# Patient Record
Sex: MALE | Race: WHITE | NOT HISPANIC OR LATINO | URBAN - METROPOLITAN AREA
[De-identification: names, ages, dates, MRNs, and addresses within clinical notes are randomized per-mention and may not be internally consistent; named-entity substitution may affect disease eponyms.]

---
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
## 2021-08-16 ENCOUNTER — INPATIENT (INPATIENT)
Facility: HOSPITAL | Age: 21
LOS: 9 days | Discharge: HOME | DRG: 885 | End: 2021-08-26
Attending: PSYCHIATRY & NEUROLOGY | Admitting: PSYCHIATRY & NEUROLOGY
Payer: COMMERCIAL

## 2021-08-16 VITALS
OXYGEN SATURATION: 97 % | WEIGHT: 169.98 LBS | HEART RATE: 110 BPM | DIASTOLIC BLOOD PRESSURE: 94 MMHG | RESPIRATION RATE: 18 BRPM | TEMPERATURE: 98 F | HEIGHT: 68 IN | SYSTOLIC BLOOD PRESSURE: 149 MMHG

## 2021-08-16 DIAGNOSIS — F20.9 SCHIZOPHRENIA, UNSPECIFIED: ICD-10-CM

## 2021-08-16 LAB
ANION GAP SERPL CALC-SCNC: 3 MMOL/L — LOW (ref 5–17)
APAP SERPL-MCNC: <2 UG/ML — LOW (ref 10–30)
APPEARANCE UR: CLEAR — SIGNIFICANT CHANGE UP
BASOPHILS # BLD AUTO: 0.05 K/UL — SIGNIFICANT CHANGE UP (ref 0–0.2)
BASOPHILS NFR BLD AUTO: 0.4 % — SIGNIFICANT CHANGE UP (ref 0–2)
BILIRUB UR-MCNC: NEGATIVE — SIGNIFICANT CHANGE UP
BUN SERPL-MCNC: 6 MG/DL — LOW (ref 7–23)
CALCIUM SERPL-MCNC: 9.7 MG/DL — SIGNIFICANT CHANGE UP (ref 8.5–10.1)
CHLORIDE SERPL-SCNC: 107 MMOL/L — SIGNIFICANT CHANGE UP (ref 96–108)
CO2 SERPL-SCNC: 27 MMOL/L — SIGNIFICANT CHANGE UP (ref 22–31)
COLOR SPEC: YELLOW — SIGNIFICANT CHANGE UP
CREAT SERPL-MCNC: 1.07 MG/DL — SIGNIFICANT CHANGE UP (ref 0.5–1.3)
DIFF PNL FLD: NEGATIVE — SIGNIFICANT CHANGE UP
EOSINOPHIL # BLD AUTO: 0.08 K/UL — SIGNIFICANT CHANGE UP (ref 0–0.5)
EOSINOPHIL NFR BLD AUTO: 0.6 % — SIGNIFICANT CHANGE UP (ref 0–6)
ETHANOL SERPL-MCNC: <10 MG/DL — SIGNIFICANT CHANGE UP (ref 0–10)
GLUCOSE SERPL-MCNC: 97 MG/DL — SIGNIFICANT CHANGE UP (ref 70–99)
GLUCOSE UR QL: NEGATIVE MG/DL — SIGNIFICANT CHANGE UP
HCT VFR BLD CALC: 49.9 % — SIGNIFICANT CHANGE UP (ref 39–50)
HGB BLD-MCNC: 17.2 G/DL — HIGH (ref 13–17)
IMM GRANULOCYTES NFR BLD AUTO: 0.4 % — SIGNIFICANT CHANGE UP (ref 0–1.5)
KETONES UR-MCNC: NEGATIVE — SIGNIFICANT CHANGE UP
LEUKOCYTE ESTERASE UR-ACNC: NEGATIVE — SIGNIFICANT CHANGE UP
LYMPHOCYTES # BLD AUTO: 0.92 K/UL — LOW (ref 1–3.3)
LYMPHOCYTES # BLD AUTO: 6.9 % — LOW (ref 13–44)
MCHC RBC-ENTMCNC: 30.6 PG — SIGNIFICANT CHANGE UP (ref 27–34)
MCHC RBC-ENTMCNC: 34.5 GM/DL — SIGNIFICANT CHANGE UP (ref 32–36)
MCV RBC AUTO: 88.6 FL — SIGNIFICANT CHANGE UP (ref 80–100)
MONOCYTES # BLD AUTO: 0.8 K/UL — SIGNIFICANT CHANGE UP (ref 0–0.9)
MONOCYTES NFR BLD AUTO: 6 % — SIGNIFICANT CHANGE UP (ref 2–14)
NEUTROPHILS # BLD AUTO: 11.44 K/UL — HIGH (ref 1.8–7.4)
NEUTROPHILS NFR BLD AUTO: 85.7 % — HIGH (ref 43–77)
NITRITE UR-MCNC: NEGATIVE — SIGNIFICANT CHANGE UP
PCP SPEC-MCNC: SIGNIFICANT CHANGE UP
PH UR: 7 — SIGNIFICANT CHANGE UP (ref 5–8)
PLATELET # BLD AUTO: 240 K/UL — SIGNIFICANT CHANGE UP (ref 150–400)
POTASSIUM SERPL-MCNC: 3.9 MMOL/L — SIGNIFICANT CHANGE UP (ref 3.5–5.3)
POTASSIUM SERPL-SCNC: 3.9 MMOL/L — SIGNIFICANT CHANGE UP (ref 3.5–5.3)
PROT UR-MCNC: NEGATIVE MG/DL — SIGNIFICANT CHANGE UP
RBC # BLD: 5.63 M/UL — SIGNIFICANT CHANGE UP (ref 4.2–5.8)
RBC # FLD: 12.5 % — SIGNIFICANT CHANGE UP (ref 10.3–14.5)
SALICYLATES SERPL-MCNC: 6 MG/DL — SIGNIFICANT CHANGE UP (ref 2.8–20)
SARS-COV-2 RNA SPEC QL NAA+PROBE: SIGNIFICANT CHANGE UP
SODIUM SERPL-SCNC: 137 MMOL/L — SIGNIFICANT CHANGE UP (ref 135–145)
SP GR SPEC: 1 — LOW (ref 1.01–1.02)
UROBILINOGEN FLD QL: NEGATIVE MG/DL — SIGNIFICANT CHANGE UP
WBC # BLD: 13.34 K/UL — HIGH (ref 3.8–10.5)
WBC # FLD AUTO: 13.34 K/UL — HIGH (ref 3.8–10.5)

## 2021-08-16 PROCEDURE — 36415 COLL VENOUS BLD VENIPUNCTURE: CPT

## 2021-08-16 PROCEDURE — 82150 ASSAY OF AMYLASE: CPT

## 2021-08-16 PROCEDURE — 85025 COMPLETE CBC W/AUTO DIFF WBC: CPT

## 2021-08-16 PROCEDURE — 71045 X-RAY EXAM CHEST 1 VIEW: CPT

## 2021-08-16 PROCEDURE — 80053 COMPREHEN METABOLIC PANEL: CPT

## 2021-08-16 PROCEDURE — 93005 ELECTROCARDIOGRAM TRACING: CPT

## 2021-08-16 PROCEDURE — 81003 URINALYSIS AUTO W/O SCOPE: CPT

## 2021-08-16 PROCEDURE — 76700 US EXAM ABDOM COMPLETE: CPT

## 2021-08-16 PROCEDURE — 99285 EMERGENCY DEPT VISIT HI MDM: CPT

## 2021-08-16 PROCEDURE — 86769 SARS-COV-2 COVID-19 ANTIBODY: CPT

## 2021-08-16 PROCEDURE — 93010 ELECTROCARDIOGRAM REPORT: CPT

## 2021-08-16 PROCEDURE — 80061 LIPID PANEL: CPT

## 2021-08-16 PROCEDURE — 83690 ASSAY OF LIPASE: CPT

## 2021-08-16 PROCEDURE — 90792 PSYCH DIAG EVAL W/MED SRVCS: CPT

## 2021-08-16 RX ORDER — DIPHENHYDRAMINE HCL 50 MG
50 CAPSULE ORAL EVERY 6 HOURS
Refills: 0 | Status: DISCONTINUED | OUTPATIENT
Start: 2021-08-16 | End: 2021-08-26

## 2021-08-16 RX ORDER — HALOPERIDOL DECANOATE 100 MG/ML
5 INJECTION INTRAMUSCULAR EVERY 6 HOURS
Refills: 0 | Status: DISCONTINUED | OUTPATIENT
Start: 2021-08-16 | End: 2021-08-26

## 2021-08-16 RX ORDER — SERTRALINE 25 MG/1
200 TABLET, FILM COATED ORAL DAILY
Refills: 0 | Status: DISCONTINUED | OUTPATIENT
Start: 2021-08-16 | End: 2021-08-17

## 2021-08-16 RX ORDER — RISPERIDONE 4 MG/1
3 TABLET ORAL
Refills: 0 | Status: DISCONTINUED | OUTPATIENT
Start: 2021-08-16 | End: 2021-08-23

## 2021-08-16 NOTE — ED ADULT NURSE NOTE - NSIMPLEMENTINTERV_GEN_ALL_ED
Implemented All Universal Safety Interventions:  [facility] to call system. Call bell, personal items and telephone within reach. Instruct patient to call for assistance. Room bathroom lighting operational. Non-slip footwear when patient is off stretcher. Physically safe environment: no spills, clutter or unnecessary equipment. Stretcher in lowest position, wheels locked, appropriate side rails in place.
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
## 2021-08-16 NOTE — ED BEHAVIORAL HEALTH ASSESSMENT NOTE - VIOLENCE RISK FACTORS:
Violent ideation/threat/speech/Affective dysregulation/Impulsivity/Lack of insight into violence risk/need for treatment
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
## 2021-08-16 NOTE — ED ADULT TRIAGE NOTE - BMI (KG/M2)
The patient will be seen in the office between 3 weeks for wound check. S Patient may shower after post-op day #5 (8/3/20). The dressing is to be removed on postop day #7 (8/5/20). IF THE DRESSING BECOMES SOILED BEFORE THE REMOVAL DATE, CHANGE WITH A SIMILAR DRESSING. IF THE DRESSING BECOMES STAINED WITH DISCHARGE, CONTACT THE OFFICE FOR FURTHER DIRECTIONS.  The patient will contact the office if the wound becomes red, has increasing pain, develops bleeding or discharge, an injury occurs, or has other concerns. The patient will continue PT for gait training. The patient will continue ASPIRIN for blood clot prevention. The patient will take OXYCODONE AND TYLENOL for pain control and titrate according to prescription and patient needs. The patient will take Colace while taking oxycodone to prevent narcotic associated constipation.  Additionally, increase water intake (drink at least 8 glasses of water daily) and try adding fiber to the diet by eating fruits, vegetables and foods that are rich in grains. If constipation is experienced, contact the medical/primary care provider to discuss further treatment options. The patient is FULL weight bearing with knee immobilizer on at all times locked in extension. 25.8
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
## 2021-08-16 NOTE — ED PROVIDER NOTE - SKIN, MLM
Skin normal color for race, warm, dry and intact. No evidence of rash. A few abrasions on knees and hands.

## 2021-08-16 NOTE — ED ADULT NURSE REASSESSMENT NOTE - NS ED NURSE REASSESS COMMENT FT1
handoff report taken from day JELLY Burns. Pt resting comfortably in bed with no acute distress noted. Pt on constant observation, updated on their status, the current plan of care, and available results no needs or requests at this time.  Will continue to monitor/reassess.

## 2021-08-16 NOTE — ED BEHAVIORAL HEALTH ASSESSMENT NOTE - DESCRIPTION
NO unemployed, playing video game all day, resides with mother and siblings in grandmother[facility] house cooperative,   poor eye contact, easily irritable

## 2021-08-17 DIAGNOSIS — F20.9 SCHIZOPHRENIA, UNSPECIFIED: ICD-10-CM

## 2021-08-17 DIAGNOSIS — F12.10 CANNABIS ABUSE, UNCOMPLICATED: ICD-10-CM

## 2021-08-17 LAB
A1C WITH ESTIMATED AVERAGE GLUCOSE RESULT: 4.9 % — SIGNIFICANT CHANGE UP (ref 4–5.6)
CHOLEST SERPL-MCNC: 208 MG/DL — HIGH
COVID-19 SPIKE DOMAIN AB INTERP: POSITIVE
COVID-19 SPIKE DOMAIN AB INTERP: POSITIVE
COVID-19 SPIKE DOMAIN ANTIBODY RESULT: >250 U/ML — HIGH
COVID-19 SPIKE DOMAIN ANTIBODY RESULT: >250 U/ML — HIGH
ESTIMATED AVERAGE GLUCOSE: 94 MG/DL — SIGNIFICANT CHANGE UP (ref 68–114)
HDLC SERPL-MCNC: 45 MG/DL — SIGNIFICANT CHANGE UP
LIPID PNL WITH DIRECT LDL SERPL: 142 MG/DL — HIGH
NON HDL CHOLESTEROL: 163 MG/DL — HIGH
SARS-COV-2 IGG+IGM SERPL QL IA: >250 U/ML — HIGH
SARS-COV-2 IGG+IGM SERPL QL IA: >250 U/ML — HIGH
SARS-COV-2 IGG+IGM SERPL QL IA: POSITIVE
SARS-COV-2 IGG+IGM SERPL QL IA: POSITIVE
TRIGL SERPL-MCNC: 106 MG/DL — SIGNIFICANT CHANGE UP

## 2021-08-17 PROCEDURE — 99223 1ST HOSP IP/OBS HIGH 75: CPT

## 2021-08-17 PROCEDURE — 93010 ELECTROCARDIOGRAM REPORT: CPT

## 2021-08-17 RX ORDER — SERTRALINE 25 MG/1
150 TABLET, FILM COATED ORAL DAILY
Refills: 0 | Status: DISCONTINUED | OUTPATIENT
Start: 2021-08-17 | End: 2021-08-18

## 2021-08-17 RX ADMIN — RISPERIDONE 3 MILLIGRAM(S): 4 TABLET ORAL at 09:40

## 2021-08-17 RX ADMIN — RISPERIDONE 3 MILLIGRAM(S): 4 TABLET ORAL at 21:36

## 2021-08-17 RX ADMIN — SERTRALINE 200 MILLIGRAM(S): 25 TABLET, FILM COATED ORAL at 09:39

## 2021-08-17 NOTE — PROGRESS NOTE BEHAVIORAL HEALTH - NSBHADDHXPSYCHFT_PSY_A_CORE
multiple hospitalizations,  mostly in [facility], no hx of SA or SI, hears voices as his baseline,  psychiatrist Dr Sree Louie of [facility]    kailash Louie of [facility]

## 2021-08-17 NOTE — PROGRESS NOTE BEHAVIORAL HEALTH - RISK ASSESSMENT
moderate risk  acute : pt denies any hallucination but is guarded, suspicious, appearing preoccupied , irritable , low frustration tolerance. Use of weapon   mitigating factors: pt denies any suicidal ideation intent or plan   protective: family support  chronic: prior psych hospitalization

## 2021-08-18 PROCEDURE — 99231 SBSQ HOSP IP/OBS SF/LOW 25: CPT

## 2021-08-18 RX ORDER — SERTRALINE 25 MG/1
200 TABLET, FILM COATED ORAL DAILY
Refills: 0 | Status: DISCONTINUED | OUTPATIENT
Start: 2021-08-19 | End: 2021-08-23

## 2021-08-18 RX ADMIN — RISPERIDONE 3 MILLIGRAM(S): 4 TABLET ORAL at 09:24

## 2021-08-18 RX ADMIN — SERTRALINE 150 MILLIGRAM(S): 25 TABLET, FILM COATED ORAL at 09:24

## 2021-08-18 RX ADMIN — RISPERIDONE 3 MILLIGRAM(S): 4 TABLET ORAL at 20:57
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
## 2021-08-18 NOTE — DIETITIAN INITIAL EVALUATION ADULT. - ORAL INTAKE PTA/DIET HISTORY
Pt reports that he was not eating at home, only drinking  Pt not eating at HH, and says he will not eat.  Pt expecting to be discharged tomorrow, as that will be 72 hours since his admit.

## 2021-08-18 NOTE — DIETITIAN INITIAL EVALUATION ADULT. - NAME AND PHONE
Yazmin Davis RDN, CDN, [facility]      [phone]   [email or web address]

## 2021-08-18 NOTE — PROGRESS NOTE BEHAVIORAL HEALTH - RISK ASSESSMENT
moderate risk  acute : pt denies any hallucination but is guarded, suspicious, appearing preoccupied , irritable , low frustration tolerance. Use of weapon , auditory hallucination ,   mitigating factors: pt denies any suicidal ideation intent or plan   protective: family support  chronic: prior psych hospitalization, hx of more self directed injury

## 2021-08-18 NOTE — DIETITIAN INITIAL EVALUATION ADULT. - PERTINENT MEDS FT
MEDICATIONS  (STANDING):  risperiDONE   Tablet 3 milliGRAM(s) Oral two times a day  sertraline 150 milliGRAM(s) Oral daily    MEDICATIONS  (PRN):  diphenhydrAMINE   Injectable 50 milliGRAM(s) IntraMuscular every 6 hours PRN Agitation  haloperidol    Injectable 5 milliGRAM(s) IntraMuscular every 6 hours PRN Agitation  LORazepam   Injectable 2 milliGRAM(s) IntraMuscular every 4 hours PRN Agitation

## 2021-08-19 PROCEDURE — 99253 IP/OBS CNSLTJ NEW/EST LOW 45: CPT

## 2021-08-19 PROCEDURE — 99232 SBSQ HOSP IP/OBS MODERATE 35: CPT

## 2021-08-19 RX ORDER — MULTIVIT-MIN/FERROUS GLUCONATE 9 MG/15 ML
1 LIQUID (ML) ORAL DAILY
Refills: 0 | Status: DISCONTINUED | OUTPATIENT
Start: 2021-08-19 | End: 2021-08-26

## 2021-08-19 RX ADMIN — RISPERIDONE 3 MILLIGRAM(S): 4 TABLET ORAL at 21:10

## 2021-08-19 RX ADMIN — RISPERIDONE 3 MILLIGRAM(S): 4 TABLET ORAL at 09:25

## 2021-08-19 RX ADMIN — SERTRALINE 200 MILLIGRAM(S): 25 TABLET, FILM COATED ORAL at 09:25
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
## 2021-08-19 NOTE — CONSULT NOTE ADULT - ATTENDING COMMENTS
Patient is seen and examined with the PA. Medicine consult was requested for patient's diarrhea and loss of appetite. Will follow stool work up and imaging studies.

## 2021-08-19 NOTE — PROGRESS NOTE BEHAVIORAL HEALTH - RISK ASSESSMENT
moderate risk  acute : pt denies any hallucination but is guarded, suspicious, appearing preoccupied , irritable , low frustration tolerance. Use of weapon , auditory hallucination , decreased appetite  mitigating factors: pt denies any suicidal ideation intent or plan   protective: family support  chronic: prior psych hospitalization, hx of more self directed injury
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
## 2021-08-20 LAB
ALBUMIN SERPL ELPH-MCNC: 4.7 G/DL — SIGNIFICANT CHANGE UP (ref 3.3–5)
ALP SERPL-CCNC: 97 U/L — SIGNIFICANT CHANGE UP (ref 40–120)
ALT FLD-CCNC: 23 U/L — SIGNIFICANT CHANGE UP (ref 12–78)
AMYLASE P1 CFR SERPL: 41 U/L — SIGNIFICANT CHANGE UP (ref 25–115)
ANION GAP SERPL CALC-SCNC: 7 MMOL/L — SIGNIFICANT CHANGE UP (ref 5–17)
AST SERPL-CCNC: 17 U/L — SIGNIFICANT CHANGE UP (ref 15–37)
BASOPHILS # BLD AUTO: 0.06 K/UL — SIGNIFICANT CHANGE UP (ref 0–0.2)
BASOPHILS NFR BLD AUTO: 0.6 % — SIGNIFICANT CHANGE UP (ref 0–2)
BILIRUB SERPL-MCNC: 1.4 MG/DL — HIGH (ref 0.2–1.2)
BUN SERPL-MCNC: 19 MG/DL — SIGNIFICANT CHANGE UP (ref 7–23)
CALCIUM SERPL-MCNC: 9.6 MG/DL — SIGNIFICANT CHANGE UP (ref 8.5–10.1)
CHLORIDE SERPL-SCNC: 103 MMOL/L — SIGNIFICANT CHANGE UP (ref 96–108)
CO2 SERPL-SCNC: 25 MMOL/L — SIGNIFICANT CHANGE UP (ref 22–31)
CREAT SERPL-MCNC: 1.07 MG/DL — SIGNIFICANT CHANGE UP (ref 0.5–1.3)
EOSINOPHIL # BLD AUTO: 0.05 K/UL — SIGNIFICANT CHANGE UP (ref 0–0.5)
EOSINOPHIL NFR BLD AUTO: 0.5 % — SIGNIFICANT CHANGE UP (ref 0–6)
GLUCOSE SERPL-MCNC: 108 MG/DL — HIGH (ref 70–99)
HCT VFR BLD CALC: 52.4 % — HIGH (ref 39–50)
HGB BLD-MCNC: 18 G/DL — HIGH (ref 13–17)
IMM GRANULOCYTES NFR BLD AUTO: 0.6 % — SIGNIFICANT CHANGE UP (ref 0–1.5)
LIDOCAIN IGE QN: 19 U/L — LOW (ref 73–393)
LYMPHOCYTES # BLD AUTO: 1.33 K/UL — SIGNIFICANT CHANGE UP (ref 1–3.3)
LYMPHOCYTES # BLD AUTO: 14.3 % — SIGNIFICANT CHANGE UP (ref 13–44)
MCHC RBC-ENTMCNC: 30 PG — SIGNIFICANT CHANGE UP (ref 27–34)
MCHC RBC-ENTMCNC: 34.4 GM/DL — SIGNIFICANT CHANGE UP (ref 32–36)
MCV RBC AUTO: 87.3 FL — SIGNIFICANT CHANGE UP (ref 80–100)
MONOCYTES # BLD AUTO: 0.66 K/UL — SIGNIFICANT CHANGE UP (ref 0–0.9)
MONOCYTES NFR BLD AUTO: 7.1 % — SIGNIFICANT CHANGE UP (ref 2–14)
NEUTROPHILS # BLD AUTO: 7.15 K/UL — SIGNIFICANT CHANGE UP (ref 1.8–7.4)
NEUTROPHILS NFR BLD AUTO: 76.9 % — SIGNIFICANT CHANGE UP (ref 43–77)
PLATELET # BLD AUTO: 251 K/UL — SIGNIFICANT CHANGE UP (ref 150–400)
POTASSIUM SERPL-MCNC: 4.1 MMOL/L — SIGNIFICANT CHANGE UP (ref 3.5–5.3)
POTASSIUM SERPL-SCNC: 4.1 MMOL/L — SIGNIFICANT CHANGE UP (ref 3.5–5.3)
PROT SERPL-MCNC: 7.5 GM/DL — SIGNIFICANT CHANGE UP (ref 6–8.3)
RBC # BLD: 6 M/UL — HIGH (ref 4.2–5.8)
RBC # FLD: 12 % — SIGNIFICANT CHANGE UP (ref 10.3–14.5)
SODIUM SERPL-SCNC: 135 MMOL/L — SIGNIFICANT CHANGE UP (ref 135–145)
WBC # BLD: 9.31 K/UL — SIGNIFICANT CHANGE UP (ref 3.8–10.5)
WBC # FLD AUTO: 9.31 K/UL — SIGNIFICANT CHANGE UP (ref 3.8–10.5)

## 2021-08-20 PROCEDURE — 99231 SBSQ HOSP IP/OBS SF/LOW 25: CPT

## 2021-08-20 PROCEDURE — 71045 X-RAY EXAM CHEST 1 VIEW: CPT | Mod: 26

## 2021-08-20 PROCEDURE — 76700 US EXAM ABDOM COMPLETE: CPT | Mod: 26

## 2021-08-20 PROCEDURE — 99232 SBSQ HOSP IP/OBS MODERATE 35: CPT

## 2021-08-20 RX ADMIN — SERTRALINE 200 MILLIGRAM(S): 25 TABLET, FILM COATED ORAL at 09:41

## 2021-08-20 RX ADMIN — RISPERIDONE 3 MILLIGRAM(S): 4 TABLET ORAL at 09:41

## 2021-08-20 RX ADMIN — Medication 1 TABLET(S): at 09:41

## 2021-08-20 RX ADMIN — RISPERIDONE 3 MILLIGRAM(S): 4 TABLET ORAL at 20:45

## 2021-08-20 NOTE — PROGRESS NOTE BEHAVIORAL HEALTH - RISK ASSESSMENT
moderate risk  acute : pt denies any hallucination but is guarded, suspicious, appearing preoccupied , less irritable , low frustration tolerance. Use of weapon , auditory hallucination , decreased appetite  mitigating factors: pt denies any suicidal ideation intent or plan   protective: family support  chronic: prior psych hospitalization, hx of more self directed injury
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
## 2021-08-21 PROCEDURE — 99231 SBSQ HOSP IP/OBS SF/LOW 25: CPT

## 2021-08-21 RX ADMIN — RISPERIDONE 3 MILLIGRAM(S): 4 TABLET ORAL at 21:18

## 2021-08-21 RX ADMIN — SERTRALINE 200 MILLIGRAM(S): 25 TABLET, FILM COATED ORAL at 09:39

## 2021-08-21 RX ADMIN — RISPERIDONE 3 MILLIGRAM(S): 4 TABLET ORAL at 09:39

## 2021-08-21 RX ADMIN — Medication 1 TABLET(S): at 09:39

## 2021-08-21 NOTE — PROGRESS NOTE BEHAVIORAL HEALTH - RISK ASSESSMENT
moderate risk of suicide  and /or aggression in the context of ongoing severe paranoid psychosis  acute : pt denies any hallucination but is guarded, suspicious, appearing preoccupied , less irritable , low frustration tolerance. Use of weapon , auditory hallucination , decreased appetite  mitigating factors: pt denies any suicidal ideation intent or plan   protective: family support  chronic: prior psych hospitalization, hx of more self directed injury

## 2021-08-21 NOTE — PROGRESS NOTE BEHAVIORAL HEALTH - OTHER
paranoid, persecutory, somatic delusional thinking, impoverished pt will not / cannot elaborate further overall paucity of speech " Fine." with little animation

## 2021-08-22 LAB
APPEARANCE UR: CLEAR — SIGNIFICANT CHANGE UP
BILIRUB UR-MCNC: NEGATIVE — SIGNIFICANT CHANGE UP
COLOR SPEC: YELLOW — SIGNIFICANT CHANGE UP
DIFF PNL FLD: NEGATIVE — SIGNIFICANT CHANGE UP
GLUCOSE UR QL: NEGATIVE MG/DL — SIGNIFICANT CHANGE UP
KETONES UR-MCNC: NEGATIVE — SIGNIFICANT CHANGE UP
LEUKOCYTE ESTERASE UR-ACNC: NEGATIVE — SIGNIFICANT CHANGE UP
NITRITE UR-MCNC: NEGATIVE — SIGNIFICANT CHANGE UP
PH UR: 7 — SIGNIFICANT CHANGE UP (ref 5–8)
PROT UR-MCNC: NEGATIVE MG/DL — SIGNIFICANT CHANGE UP
SP GR SPEC: 1 — LOW (ref 1.01–1.02)
UROBILINOGEN FLD QL: NEGATIVE MG/DL — SIGNIFICANT CHANGE UP

## 2021-08-22 PROCEDURE — 99231 SBSQ HOSP IP/OBS SF/LOW 25: CPT

## 2021-08-22 RX ADMIN — RISPERIDONE 3 MILLIGRAM(S): 4 TABLET ORAL at 20:29

## 2021-08-22 RX ADMIN — Medication 1 TABLET(S): at 09:11

## 2021-08-22 RX ADMIN — RISPERIDONE 3 MILLIGRAM(S): 4 TABLET ORAL at 09:11

## 2021-08-22 RX ADMIN — SERTRALINE 200 MILLIGRAM(S): 25 TABLET, FILM COATED ORAL at 09:11

## 2021-08-22 NOTE — PROGRESS NOTE BEHAVIORAL HEALTH - OTHER
robotic, superficially cooperative but not accurately forthcoming steady  but wary eye contact " Fine." pt will not / cannot elaborate further withdrawn, reclusive with little animation overall paucity of speech paranoid, persecutory, somatic delusional thinking, impoverished

## 2021-08-23 PROCEDURE — 99232 SBSQ HOSP IP/OBS MODERATE 35: CPT

## 2021-08-23 RX ORDER — HALOPERIDOL DECANOATE 100 MG/ML
5 INJECTION INTRAMUSCULAR
Refills: 0 | Status: DISCONTINUED | OUTPATIENT
Start: 2021-08-23 | End: 2021-08-26

## 2021-08-23 RX ORDER — SERTRALINE 25 MG/1
100 TABLET, FILM COATED ORAL DAILY
Refills: 0 | Status: DISCONTINUED | OUTPATIENT
Start: 2021-08-24 | End: 2021-08-26

## 2021-08-23 RX ADMIN — HALOPERIDOL DECANOATE 5 MILLIGRAM(S): 100 INJECTION INTRAMUSCULAR at 20:35

## 2021-08-23 RX ADMIN — SERTRALINE 200 MILLIGRAM(S): 25 TABLET, FILM COATED ORAL at 10:05

## 2021-08-23 RX ADMIN — RISPERIDONE 3 MILLIGRAM(S): 4 TABLET ORAL at 10:05

## 2021-08-23 RX ADMIN — Medication 1 TABLET(S): at 10:05

## 2021-08-23 NOTE — PROGRESS NOTE BEHAVIORAL HEALTH - OTHER
" Fine." pt will not / cannot elaborate further paranoid, persecutory, somatic delusional thinking, impoverished robotic, superficially cooperative but not accurately forthcoming withdrawn, reclusive overall paucity of speech steady  but wary eye contact with little animation

## 2021-08-24 PROCEDURE — 99232 SBSQ HOSP IP/OBS MODERATE 35: CPT

## 2021-08-24 RX ADMIN — HALOPERIDOL DECANOATE 5 MILLIGRAM(S): 100 INJECTION INTRAMUSCULAR at 21:08

## 2021-08-24 RX ADMIN — Medication 1 TABLET(S): at 09:49

## 2021-08-24 RX ADMIN — SERTRALINE 100 MILLIGRAM(S): 25 TABLET, FILM COATED ORAL at 09:49

## 2021-08-24 RX ADMIN — HALOPERIDOL DECANOATE 5 MILLIGRAM(S): 100 INJECTION INTRAMUSCULAR at 09:49

## 2021-08-24 NOTE — PROGRESS NOTE BEHAVIORAL HEALTH - RISK ASSESSMENT
lowrisk of suicide  still with some preoccupation with paranoid psychosis  acute : pt denies any hallucination but is guarded, suspicious, appearing preoccupied , less irritable , low frustration tolerance. , auditory hallucination , decreased appetite  mitigating factors: pt denies any suicidal ideation intent or plan   protective: family support  chronic: prior psych hospitalization, hx of more self directed injury low risk of suicide  still with some preoccupation with paranoid psychosis but pt is more interactive  acute : pt denies any hallucination but is guarded, suspicious, appearing preoccupied , less irritable , low frustration tolerance. , auditory hallucination , decreased appetite  mitigating factors: pt denies any suicidal ideation intent or plan   protective: family support  chronic: prior psych hospitalization, hx of more self directed injury

## 2021-08-24 NOTE — PROGRESS NOTE BEHAVIORAL HEALTH - OTHER
steady  but wary eye contact robotic, superficially cooperative but not accurately forthcoming " Fine." pt will not / cannot elaborate further withdrawn, reclusive with little animation paranoid, persecutory, somatic delusional thinking, impoverished

## 2021-08-25 PROCEDURE — 99232 SBSQ HOSP IP/OBS MODERATE 35: CPT

## 2021-08-25 RX ADMIN — HALOPERIDOL DECANOATE 5 MILLIGRAM(S): 100 INJECTION INTRAMUSCULAR at 21:03

## 2021-08-25 RX ADMIN — Medication 1 TABLET(S): at 09:09

## 2021-08-25 RX ADMIN — SERTRALINE 100 MILLIGRAM(S): 25 TABLET, FILM COATED ORAL at 09:09

## 2021-08-25 RX ADMIN — HALOPERIDOL DECANOATE 5 MILLIGRAM(S): 100 INJECTION INTRAMUSCULAR at 09:09

## 2021-08-25 NOTE — PROGRESS NOTE BEHAVIORAL HEALTH - GAIT / STATION
Normal gait / station
Normal gait / station
Other
Other
Normal gait / station
Normal gait / station
Other
Other
Normal gait / station

## 2021-08-25 NOTE — PROGRESS NOTE BEHAVIORAL HEALTH - NSBHPTASSESSDT_PSY_A_CORE
17-Aug-2021 14:06
25-Aug-2021 11:47
21-Aug-2021
18-Aug-2021 10:14
23-Aug-2021 18:58
19-Aug-2021 15:24
20-Aug-2021 17:27
22-Aug-2021
24-Aug-2021 09:46

## 2021-08-25 NOTE — PROGRESS NOTE BEHAVIORAL HEALTH - NSBHADMITIPOBSFT_PSY_A_CORE
pt denies any suicidal ideation intent or plan

## 2021-08-25 NOTE — PROGRESS NOTE BEHAVIORAL HEALTH - NSBHFUPINTERVALCCFT_PSY_A_CORE
"I'm leaving today ?"
"I would like to go home.
'When do I leave "
" Do you know when I will be discharged?"
"ask my mother "
"what do I have to do to go home?"
"when do I go home.

## 2021-08-25 NOTE — PROGRESS NOTE BEHAVIORAL HEALTH - ORIENTATION OTHER
pt with ongoing focus on DC home
pt with ongoing focus on DC home
will not answer questions
will not answer questions
pt with ongoing focus on DC home
will not answer questions
pt with ongoing focus on DC home
pt with ongoing focus on DC home
will not answer questions

## 2021-08-25 NOTE — PROGRESS NOTE BEHAVIORAL HEALTH - BEHAVIOR
Uncooperative/Other
Uncooperative/Other
Uncooperative
Cooperative
Uncooperative/Other

## 2021-08-25 NOTE — PROGRESS NOTE BEHAVIORAL HEALTH - ESTIMATED DISCHARGE DATE
27-Aug-2021
31-Aug-2021
27-Aug-2021
31-Aug-2021
27-Aug-2021

## 2021-08-25 NOTE — PROGRESS NOTE BEHAVIORAL HEALTH - SECONDARY DX1
Cannabis abuse

## 2021-08-25 NOTE — PROGRESS NOTE BEHAVIORAL HEALTH - PERCEPTIONS
Auditory hallucinations/Other

## 2021-08-25 NOTE — PROGRESS NOTE BEHAVIORAL HEALTH - NSBHFUPTYPE_PSY_A_CORE
Inpatient
Inpatient-On Service Note
Inpatient

## 2021-08-25 NOTE — PROGRESS NOTE BEHAVIORAL HEALTH - NSBHCHARTREVIEWVS_PSY_A_CORE FT
Vital Signs Last 24 Hrs  T(C): 37.2 (25 Aug 2021 07:54), Max: 37.2 (25 Aug 2021 07:54)  T(F): 99 (25 Aug 2021 07:54), Max: 99 (25 Aug 2021 07:54)  HR: --  BP: --  BP(mean): --  RR: 12 (25 Aug 2021 07:54) (12 - 12)  SpO2: 99% (25 Aug 2021 07:54) (99% - 99%)
Vital Signs Last 24 Hrs  T(C): 36.8 (18 Aug 2021 07:18), Max: 36.8 (18 Aug 2021 07:18)  T(F): 98.3 (18 Aug 2021 07:18), Max: 98.3 (18 Aug 2021 07:18)  HR: --  BP: --  BP(mean): --  RR: 12 (18 Aug 2021 07:18) (12 - 12)  SpO2: 100% (18 Aug 2021 07:18) (100% - 100%)
Vital Signs Last 24 Hrs  T(C): 36.8 (24 Aug 2021 07:35), Max: 36.8 (24 Aug 2021 07:35)  T(F): 98.2 (24 Aug 2021 07:35), Max: 98.2 (24 Aug 2021 07:35)  HR: --  BP: --  BP(mean): --  RR: 14 (24 Aug 2021 07:35) (14 - 14)  SpO2: 100% (24 Aug 2021 07:35) (100% - 100%)
Vital Signs Last 24 Hrs  T(C): 36.8 (21 Aug 2021 08:18), Max: 36.8 (21 Aug 2021 08:18)  T(F): 98.2 (21 Aug 2021 08:18), Max: 98.2 (21 Aug 2021 08:18)  HR: --  BP: --  BP(mean): --  RR: 18 (21 Aug 2021 08:18) (18 - 18)  SpO2: 100% (21 Aug 2021 08:18) (100% - 100%)
Vital Signs Last 24 Hrs  T(C): 36.8 (18 Aug 2021 07:18), Max: 36.8 (18 Aug 2021 07:18)  T(F): 98.3 (18 Aug 2021 07:18), Max: 98.3 (18 Aug 2021 07:18)  HR: --  BP: --  BP(mean): --  RR: 12 (18 Aug 2021 07:18) (12 - 12)  SpO2: 100% (18 Aug 2021 07:18) (100% - 100%)
Vital Signs Last 24 Hrs  T(C): 36.7 (20 Aug 2021 07:34), Max: 36.8 (19 Aug 2021 19:56)  T(F): 98.1 (20 Aug 2021 07:34), Max: 98.2 (19 Aug 2021 19:56)  HR: 91 (19 Aug 2021 19:56) (91 - 91)  BP: 122/77 (19 Aug 2021 19:56) (122/77 - 122/77)  BP(mean): --  RR: 14 (20 Aug 2021 07:34) (14 - 18)  SpO2: 100% (20 Aug 2021 07:34) (96% - 100%)
Vital Signs Last 24 Hrs  T(C): 36.8 (22 Aug 2021 08:24), Max: 36.8 (22 Aug 2021 08:24)  T(F): 98.3 (22 Aug 2021 08:24), Max: 98.3 (22 Aug 2021 08:24)  HR: --  BP: --  BP(mean): --  RR: 16 (22 Aug 2021 08:24) (16 - 16)  SpO2: 98% (22 Aug 2021 08:24) (98% - 98%)
Vital Signs Last 24 Hrs  T(C): 36.8 (22 Aug 2021 08:24), Max: 36.8 (22 Aug 2021 08:24)  T(F): 98.3 (22 Aug 2021 08:24), Max: 98.3 (22 Aug 2021 08:24)  HR: --  BP: --  BP(mean): --  RR: 16 (22 Aug 2021 08:24) (16 - 16)  SpO2: 98% (22 Aug 2021 08:24) (98% - 98%)
Vital Signs Last 24 Hrs  T(C): 37.2 (17 Aug 2021 08:41), Max: 37.2 (17 Aug 2021 08:41)  T(F): 99 (17 Aug 2021 08:41), Max: 99 (17 Aug 2021 08:41)  HR: 92 (16 Aug 2021 19:58) (92 - 92)  BP: 109/62 (16 Aug 2021 19:58) (109/62 - 109/62)  BP(mean): --  RR: 18 (17 Aug 2021 08:41) (18 - 18)  SpO2: 100% (17 Aug 2021 08:41) (97% - 100%)

## 2021-08-25 NOTE — PROGRESS NOTE BEHAVIORAL HEALTH - OTHER
pt will not / cannot elaborate further,  chronic paranoid, persecutory, somatic delusional thinking, impoverished concrete " Fine."

## 2021-08-25 NOTE — PROGRESS NOTE BEHAVIORAL HEALTH - AFFECT CONGRUENCE
Not congruent

## 2021-08-25 NOTE — PROGRESS NOTE BEHAVIORAL HEALTH - PROBLEM SELECTOR PLAN 1
MEDICATIONS  (STANDING):  d/c risperiDONE   Tablet 3 milliGRAM(s) Oral two times a day  decreased sertraline 100 milliGRAM(s) Oral daily  pt to attend groups for insight and coping skills.  encourage attenance with groups.  haldol 5mg po bid
MEDICATIONS  (STANDING):  risperiDONE   Tablet 3 milliGRAM(s) Oral two times a day  sertraline 200 milliGRAM(s) Oral daily  pt to attend groups for insight and coping skills.
MEDICATIONS  (STANDING):  d/c risperiDONE   Tablet 3 milliGRAM(s) Oral two times a day  decreased sertraline 100 milliGRAM(s) Oral daily  pt to attend groups for insight and coping skills.  encourage attenance with groups.  haldol 5mg po bid
MEDICATIONS  (STANDING):  risperiDONE   Tablet 3 milliGRAM(s) Oral two times a day  sertraline 200 milliGRAM(s) Oral daily  pt to attend groups for insight and coping skills.
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
## 2021-08-25 NOTE — PROGRESS NOTE BEHAVIORAL HEALTH - PROBLEM SELECTOR PLAN 2
pt encouraged to attend groups for relapse prevention plan
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
## 2021-08-25 NOTE — PROGRESS NOTE BEHAVIORAL HEALTH - RISK ASSESSMENT
low risk of suicide  still with some preoccupation with paranoid psychosis but pt is more interactive  acute : pt denies any hallucination but is less suspicious, less preoccupied , less irritable , low frustration tolerance. , auditory hallucination , decreased appetite  mitigating factors: pt denies any suicidal ideation intent or plan   protective: family support  chronic: prior psych hospitalization, hx of more self directed injury

## 2021-08-25 NOTE — PROGRESS NOTE BEHAVIORAL HEALTH - NSBHCHARTREVIEWINVESTIGATE_PSY_A_CORE FT
Ventricular Rate 89 BPM    Atrial Rate 89 BPM    P-R Interval 188 ms    QRS Duration 88 ms    Q-T Interval 372 ms    QTC Calculation(Bazett) 452 ms    P Axis 79 degrees    R Axis 93 degrees    T Axis 47 degrees    Diagnosis Line Normal sinus rhythmwith sinus arrhythmia  Rightward axis  Borderline ECG  No previous ECGs available  Confirmed by JENNA ESCOBAR MD ([id number]) on 8/16/2021 7:37:19 PM
Ventricular Rate 89 BPM    Atrial Rate 89 BPM    P-R Interval 188 ms    QRS Duration 88 ms    Q-T Interval 372 ms    QTC Calculation(Bazett) 452 ms    P Axis 79 degrees    R Axis 93 degrees    T Axis 47 degrees    Diagnosis Line Normal sinus rhythmwith sinus arrhythmia  Rightward axis  Borderline ECG  No previous ECGs available  Confirmed by JENNA ESCOBAR MD ([id number]) on 8/16/2021 7:37:19 PM
Ventricular Rate 89 BPM    Atrial Rate 89 BPM    P-R Interval 188 ms    QRS Duration 88 ms    Q-T Interval 372 ms    QTC Calculation(Bazett) 452 ms    P Axis 79 degrees    R Axis 93 degrees    T Axis 47 degrees    Diagnosis Line Normal sinus rhythmwith sinus arrhythmia  Rightward axis  Borderline ECG  No previous ECGs available  Confirmed by JENNA ESCOBAR MD ([id number]) on 8/16/2021 7:37:19 PM
Ventricular Rate 89 BPM    Atrial Rate 89 BPM    P-R Interval 188 ms    QRS Duration 88 ms    Q-T Interval 372 ms    QTC Calculation(Bazett) 452 ms    P Axis 79 degrees    R Axis 93 degrees    T Axis 47 degrees    Diagnosis Line Normal sinus rhythmwith sinus arrhythmia  Rightward axis  Borderline ECG  No previous ECGs available  Confirmed by JENNA ESCOBAR MD ([id number]) on 8/16/2021 7:37:19 PM
Ventricular Rate 89 BPM    Atrial Rate 89 BPM    P-R Interval 188 ms    QRS Duration 88 ms    Q-T Interval 372 ms    QTC Calculation(Bazett) 452 ms    P Axis 79 degrees    R Axis 93 degrees    T Axis 47 degrees    Diagnosis Line Normal sinus rhythmwith sinus arrhythmia  Rightward axis  Borderline ECG  No previous ECGs available  Confirmed by JENNA ESCOBAR MD [id number]) on 8/16/2021 7:37:19 PM
Ventricular Rate 89 BPM    Atrial Rate 89 BPM    P-R Interval 188 ms    QRS Duration 88 ms    Q-T Interval 372 ms    QTC Calculation(Bazett) 452 ms    P Axis 79 degrees    R Axis 93 degrees    T Axis 47 degrees    Diagnosis Line Normal sinus rhythmwith sinus arrhythmia  Rightward axis  Borderline ECG  No previous ECGs available  Confirmed by JENNA ESCOBAR MD ([id number]) on 8/16/2021 7:37:19 PM
Ventricular Rate 89 BPM    Atrial Rate 89 BPM    P-R Interval 188 ms    QRS Duration 88 ms    Q-T Interval 372 ms    QTC Calculation(Bazett) 452 ms    P Axis 79 degrees    R Axis 93 degrees    T Axis 47 degrees    Diagnosis Line Normal sinus rhythmwith sinus arrhythmia  Rightward axis  Borderline ECG  No previous ECGs available  Confirmed by JENNA ESCOBAR MD ([id number]) on 8/16/2021 7:37:19 PM
Ventricular Rate 89 BPM    Atrial Rate 89 BPM    P-R Interval 188 ms    QRS Duration 88 ms    Q-T Interval 372 ms    QTC Calculation(Bazett) 452 ms    P Axis 79 degrees    R Axis 93 degrees    T Axis 47 degrees    Diagnosis Line Normal sinus rhythmwith sinus arrhythmia  Rightward axis  Borderline ECG  No previous ECGs available  Confirmed by JENNA ESCOBAR MD ([id number]) on 8/16/2021 7:37:19 PM
Ventricular Rate 89 BPM    Atrial Rate 89 BPM    P-R Interval 188 ms    QRS Duration 88 ms    Q-T Interval 372 ms    QTC Calculation(Bazett) 452 ms    P Axis 79 degrees    R Axis 93 degrees    T Axis 47 degrees    Diagnosis Line Normal sinus rhythmwith sinus arrhythmia  Rightward axis  Borderline ECG  No previous ECGs available  Confirmed by JENNA ESCOBAR MD ([id number]) on 8/16/2021 7:37:19 PM

## 2021-08-25 NOTE — PROGRESS NOTE BEHAVIORAL HEALTH - AFFECT QUALITY
Irritable/Anxious
Irritable/Anxious
Depressed/Irritable
Anxious/Euthymic
Depressed/Irritable
Depressed/Irritable
Irritable/Anxious
Depressed/Irritable
Irritable/Anxious

## 2021-08-25 NOTE — PROGRESS NOTE BEHAVIORAL HEALTH - NSBHCHARTREVIEWIMAGING_PSY_A_CORE FT
MEDICATIONS  (STANDING):  multivitamin/minerals 1 Tablet(s) Oral daily  risperiDONE   Tablet 3 milliGRAM(s) Oral two times a day  sertraline 200 milliGRAM(s) Oral daily    MEDICATIONS  (PRN):  diphenhydrAMINE   Injectable 50 milliGRAM(s) IntraMuscular every 6 hours PRN Agitation  haloperidol    Injectable 5 milliGRAM(s) IntraMuscular every 6 hours PRN Agitation  LORazepam   Injectable 2 milliGRAM(s) IntraMuscular every 4 hours PRN Agitation

## 2021-08-25 NOTE — PROGRESS NOTE BEHAVIORAL HEALTH - PRIMARY DX
Schizophrenia, unspecified

## 2021-08-25 NOTE — PROGRESS NOTE BEHAVIORAL HEALTH - NSBHCHARTREVIEWLAB_PSY_A_CORE FT
17.2   13.34 )-----------( 240      ( 16 Aug 2021 09:23 )             49.9     CBC Full  -  ( 16 Aug 2021 09:23 )  WBC Count : 13.34 K/uL  RBC Count : 5.63 M/uL  Hemoglobin : 17.2 g/dL  Hematocrit : 49.9 %  Platelet Count - Automated : 240 K/uL  Mean Cell Volume : 88.6 fl  Mean Cell Hemoglobin : 30.6 pg  Mean Cell Hemoglobin Concentration : 34.5 gm/dL  Auto Neutrophil # : 11.44 K/uL  Auto Lymphocyte # : 0.92 K/uL  Auto Monocyte # : 0.80 K/uL  Auto Eosinophil # : 0.08 K/uL  Auto Basophil # : 0.05 K/uL  Auto Neutrophil % : 85.7 %  Auto Lymphocyte % : 6.9 %  Auto Monocyte % : 6.0 %  Auto Eosinophil % : 0.6 %  Auto Basophil % : 0.4 %

## 2021-08-25 NOTE — PROGRESS NOTE BEHAVIORAL HEALTH - THOUGHT PROCESS
Illogical/Impaired reasoning/Disorganized
Illogical/Impaired reasoning
Illogical/Impaired reasoning
Illogical/Impaired reasoning/Disorganized
Illogical/Impaired reasoning/Disorganized
Illogical/Impaired reasoning
Illogical/Impaired reasoning/Disorganized
Illogical/Impaired reasoning
Linear/Other

## 2021-08-25 NOTE — PROGRESS NOTE BEHAVIORAL HEALTH - THOUGHT CONTENT
Preoccupations
Delusions/Preoccupations/Other
Preoccupations
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
## 2021-08-25 NOTE — PROGRESS NOTE BEHAVIORAL HEALTH - NSBHROSSYSTEMS_PSY_ALL_CORE
see hpi for psych

## 2021-08-26 VITALS — OXYGEN SATURATION: 99 % | RESPIRATION RATE: 18 BRPM | TEMPERATURE: 99 F

## 2021-08-26 PROCEDURE — 99239 HOSP IP/OBS DSCHRG MGMT >30: CPT

## 2021-08-26 RX ORDER — MULTIVIT-MIN/FERROUS GLUCONATE 9 MG/15 ML
1 LIQUID (ML) ORAL
Qty: 0 | Refills: 0 | DISCHARGE
Start: 2021-08-26

## 2021-08-26 RX ORDER — HALOPERIDOL DECANOATE 100 MG/ML
1 INJECTION INTRAMUSCULAR
Qty: 28 | Refills: 1
Start: 2021-08-26 | End: 2021-09-22

## 2021-08-26 RX ORDER — SERTRALINE 25 MG/1
1 TABLET, FILM COATED ORAL
Qty: 14 | Refills: 1
Start: 2021-08-26 | End: 2021-09-22

## 2021-08-26 RX ADMIN — HALOPERIDOL DECANOATE 5 MILLIGRAM(S): 100 INJECTION INTRAMUSCULAR at 09:09

## 2021-08-26 RX ADMIN — SERTRALINE 100 MILLIGRAM(S): 25 TABLET, FILM COATED ORAL at 09:09

## 2021-08-26 RX ADMIN — Medication 1 TABLET(S): at 09:09

## 2021-08-26 NOTE — DISCHARGE NOTE BEHAVIORAL HEALTH - FAMILY HISTORY OF PSYCHIATRIC ILLNESS
lives with mother Pt. single, resides in private family residence w/ mother, uncle, brother, and sister, parents are  (mother Carleen Sample ph. [phone] and father Martínez Sample), both involved and supportive, has  through [employer] Blaine Watts (c. [phone] w. [phone]), unspecified family hx. of mental illness, no known hx. of abuse/trauma.

## 2021-08-26 NOTE — DISCHARGE NOTE BEHAVIORAL HEALTH - NSTOBACCOREFERRAL_GEN_A_CS
Patient declined information Pt. declined smoking cessation referral at admission and discharge/Patient declined information Yes

## 2021-08-26 NOTE — DISCHARGE NOTE BEHAVIORAL HEALTH - NS MD DC FALL RISK RISK
For information on Fall & injury Prevention, visit [email or web address]

## 2021-08-26 NOTE — DISCHARGE NOTE BEHAVIORAL HEALTH - NSBHDCREFEROTHERFT_PSY_A_CORE
Pt. is declining referral for therapist or therapeutic program including [facility]. Please call for assistance with referral in the future as needed

## 2021-08-26 NOTE — DISCHARGE NOTE BEHAVIORAL HEALTH - NSBHDCADDR1FT_A_CORE
[address], [facility] [address]    TELEHEALTH follow up appointment with Dr. Joselito Louie scheduled for Wednesday, 9/1 at 10am via TEAMS. The link for the appointment will be sent to your mother's email as requested.

## 2021-08-26 NOTE — DISCHARGE NOTE BEHAVIORAL HEALTH - NSBHDCNOCAREGVRFT_PSY_A_CORE
Pt. declined to elect one. However, with pt.'s consent, pt.'s mother Renata Sample (ph. [phone]). Pt. declined to elect one. However, with pt.'s consent, pt.'s mother Renata Sample (ph. [phone]).

## 2021-08-26 NOTE — DISCHARGE NOTE BEHAVIORAL HEALTH - NSBHDCCRISISPLAN3FT_PSY_A_CORE
Discuss in treatment with counselor, attended a local/online self-help group, call a hotline ([facility] (Substance Abuse and Mental Health Services Administration)[phone])
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
## 2021-08-26 NOTE — DISCHARGE NOTE BEHAVIORAL HEALTH - NSBHDCRESOURCESOTHERFT_PSY_A_CORE
[facility]- for psychiatric crises (available AFTER HOURS)  24/7 hotline: [phone]  Address: [address] [place] [address] [place] [address]    Mental Health Support Groups  [facility]  Call: [phone]  [email or web address]    [facility] [place] (*has virtual groups and for family members also)  [phone] ([phone])  [email or web address]

## 2021-08-26 NOTE — DISCHARGE NOTE BEHAVIORAL HEALTH - NSBHDCMEDSFT_PSY_A_CORE
MEDICATIONS  (STANDING):  haloperidol     Tablet 5 milliGRAM(s) Oral two times a day  multivitamin/minerals 1 Tablet(s) Oral daily  sertraline 100 milliGRAM(s) Oral daily  Pt is directed to continue with all medications until directed by his MD to change or discontinued with medications

## 2021-08-26 NOTE — DISCHARGE NOTE BEHAVIORAL HEALTH - NSBHDCTHERAPYFT_PSY_A_CORE
milieu therapy, supportive group therapy Individual, group, medication management, safety planning with 15 min safety checks, discharge planning with family involvement as needed.

## 2021-08-26 NOTE — DISCHARGE NOTE BEHAVIORAL HEALTH - CONDITIONS AT DISCHARGE
Pt is A&Ox4 and denies any pain/discomfort. Pt denies AH/VH, denies SI/HI. Pt was educated about discharge instructions and medication information  by SW and RN and verbalizes understanding. Pt is leaving with all valuables and belongings in his possession and is in clothing appropriate for weather and situation. Pt is being picked up by his mother who is driving him to ensure he gets home safely.

## 2021-08-26 NOTE — DISCHARGE NOTE BEHAVIORAL HEALTH - NSBHDCSWCOMMENTSFT_PSY_A_CORE
Pt. was educated on the importance of taking medications as prescribed and to not stop or miss any doses unless directed by prescribing provider. Pt. was counseled on the importance of attending outpatient appointments for ongoing development of coping skills in particular. Pt. was made aware of crisis resources to use after hours as needed including returning to the hospital.    Pt. and family were educated on the importance of pt. taking medications as prescribed and to not stop or miss any doses unless directed by prescribing provider. Pt. and family were counseled on the importance of pt. attending outpatient appointments for ongoing development of coping skills in particular. Pt. and family were made aware of crisis resources to use after hours as needed including returning to the hospital.

## 2021-08-26 NOTE — DISCHARGE NOTE BEHAVIORAL HEALTH - NSBHDCDXVALIDYESFT_PSY_A_CORE
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 chronic auditory hallucination of voices

## 2021-08-26 NOTE — DISCHARGE NOTE BEHAVIORAL HEALTH - NSBHDCADDFT_PSY_A_CORE
HgA1c 4.9  08-17 Chol 208<H> LDL -- HDL 45 Trig 106  Ventricular Rate 90 BPM    Atrial Rate 90 BPM    P-R Interval 168 ms    QRS Duration 84 ms    Q-T Interval 354 ms    QTC Calculation(Bazett) 433 ms    P Axis 80 degrees    R Axis 91 degrees    T Axis 55 degrees    Diagnosis Line Normal sinus rhythm  Rightward axis  Borderline ECG  When compared with ECG of 16-AUG-2021 08:24,  No significant change was found  Confirmed by SHAWN CATHERINE, JENNA ([id number]) on 8/17/2021 8:24:50 PM

## 2021-08-26 NOTE — DISCHARGE NOTE BEHAVIORAL HEALTH - NSBHDCCRISISPLAN1FT_PSY_A_CORE
Tell a trusted friend/family member, tell your provider, call a crisis line ( [facility] ph. [phone], [facility] [phone], [facility] (peer support) ph. [phone]), return to the nearest emergency room. You may call 9-1-1 for assistance.

## 2021-08-26 NOTE — DISCHARGE NOTE BEHAVIORAL HEALTH - NSTOBACCOHOTLINE_GEN_A_CS
[facility] Smokers Quitline (434-RR-CNBEU) PROBLEM DIAGNOSES  Problem: History of COPD  Assessment and Plan: obtain pulmonary evaluation , PFTs CT Scan of lungs and kidney     Problem: CAD, multiple vessel  Assessment and Plan: Stop Brilinta 7 days prior stay on aspirin  obtain cardiac eval including note, ekg, echo, stress    Problem: Unilateral primary osteoarthritis, left hip  Assessment and Plan: left anterior approach total hip replacement

## 2021-08-26 NOTE — DISCHARGE NOTE BEHAVIORAL HEALTH - PAST PSYCHIATRIC HISTORY
multiple hospitalizations,  mostly in [facility], no hx of SA or SI, hears voices as his baseline,  psychiatrist Dr Juan Pablo Louie of [facility]

## 2021-08-26 NOTE — DISCHARGE NOTE BEHAVIORAL HEALTH - NSBHDCCRISISPLAN2FT_PSY_A_CORE
Call [facility] 5N: [phone]  JEFERSON- Gloria Guillen [facility]  Psychiatrist- Dr. Theodora Cabrales DO

## 2021-08-31 DIAGNOSIS — R19.7 DIARRHEA, UNSPECIFIED: ICD-10-CM

## 2021-08-31 DIAGNOSIS — R63.0 ANOREXIA: ICD-10-CM

## 2021-08-31 DIAGNOSIS — F20.9 SCHIZOPHRENIA, UNSPECIFIED: ICD-10-CM

## 2021-08-31 DIAGNOSIS — D72.829 ELEVATED WHITE BLOOD CELL COUNT, UNSPECIFIED: ICD-10-CM

## 2021-08-31 DIAGNOSIS — F12.10 CANNABIS ABUSE, UNCOMPLICATED: ICD-10-CM

## 2021-09-13 NOTE — DISCHARGE NOTE BEHAVIORAL HEALTH - NSBHDCSUICPREVNU_PSY_A_CORE
Sports Medicine New Consult Note    Referring Provider: self    Chief Complaint   Patient presents with   • Knee     RT side pressure and inflammation       HPI: Armin is a 40 year old male presenting today C/O RT knee pressure and swelling for about 2 weeks, no specific mechanism. Patient states there is some pressure constantly that worsens with kneeling.  Patient states there was about 15 ML drained of fluid.  Patient states he is often kneeling while working.  Patient has no pain while running.  He is scheduled to do the [employer] marathon in a couple of weeks.      Review of Systems   Constitutional: Negative for activity change, appetite change, fatigue and unexpected weight change.   HENT: Negative for congestion and facial swelling.    Eyes: Negative for photophobia and visual disturbance.   Respiratory: Negative for chest tightness, shortness of breath, wheezing and stridor.    Cardiovascular: Negative for chest pain and palpitations.   Gastrointestinal: Negative for abdominal distention, abdominal pain, diarrhea, nausea and vomiting.   Endocrine: Negative for polyuria.   Genitourinary: Negative for flank pain, frequency and urgency.   Musculoskeletal: Positive for joint swelling. Negative for arthralgias and myalgias.   Skin: Negative for rash.   Allergic/Immunologic: Negative for food allergies.   Neurological: Negative for dizziness, weakness and headaches.   Hematological: Negative for adenopathy.   Psychiatric/Behavioral: Negative for sleep disturbance.       No past medical history on file.    No past surgical history on file. no history of knee surgery    No family history on file.,   No immediate family members with RA, SLE, gout    ALLERGIES:  Not on File    Current Outpatient Medications   Medication Sig Dispense Refill   • venlafaxine XR (EFFEXOR XR) 150 MG 24 hr capsule        No current facility-administered medications for this visit.       Social History     Socioeconomic History   • Marital  status: Not on file     Spouse name: Not on file   • Number of children: Not on file   • Years of education: Not on file   • Highest education level: Not on file   Occupational History   • Not on file   Tobacco Use   • Smoking status: Not on file   Substance and Sexual Activity   • Alcohol use: Not on file   • Drug use: Not on file   • Sexual activity: Not on file   Other Topics Concern   • Not on file   Social History Narrative   • Not on file     Social Determinants of Health     Financial Resource Strain:    • Social Determinants: Financial Resource Strain:    Food Insecurity:    • Social Determinants: Food Insecurity:    Transportation Needs:    • Lack of Transportation (Medical):    • Lack of Transportation (Non-Medical):    Physical Activity:    • Days of Exercise per Week:    • Minutes of Exercise per Session:    Stress:    • Social Determinants: Stress:    Social Connections:    • Social Determinants: Social Connections:    Intimate Partner Violence:    • Social Determinants: Intimate Partner Violence Past Fear:    • Social Determinants: Intimate Partner Violence Current Fear:        Physical Exam  Constitutional:       General: He is not in acute distress.     Appearance: Normal appearance. He is well-developed. He is not diaphoretic.   HENT:      Head: Normocephalic and atraumatic. No Price's sign, right periorbital erythema or left periorbital erythema.      Right Ear: Hearing and external ear normal.      Left Ear: Hearing and external ear normal.      Nose: Nose normal. No nasal deformity.   Eyes:      General: Lids are normal.         Right eye: No discharge.         Left eye: No discharge.      Conjunctiva/sclera: Conjunctivae normal.      Pupils: Pupils are equal, round, and reactive to light.   Neck:      Thyroid: No thyroid mass.      Trachea: Phonation normal.   Cardiovascular:      Rate and Rhythm: Normal rate and regular rhythm.   Pulmonary:      Effort: Pulmonary effort is normal. No accessory  muscle usage or respiratory distress.      Breath sounds: No stridor. No wheezing.   Abdominal:      General: There is no distension.   Musculoskeletal:      Cervical back: Full passive range of motion without pain and normal range of motion.      Right knee: No effusion.      Instability Tests: Medial Penny test negative and lateral Penny test negative.   Lymphadenopathy:      Cervical: No cervical adenopathy.   Skin:     General: Skin is warm and dry.      Capillary Refill: Capillary refill takes less than 2 seconds.      Findings: No rash.   Neurological:      Mental Status: He is alert and oriented to person, place, and time.      GCS: GCS eye subscore is 4. GCS verbal subscore is 5. GCS motor subscore is 6.      Cranial Nerves: No cranial nerve deficit.      Sensory: No sensory deficit.      Motor: No abnormal muscle tone.      Coordination: Coordination normal.      Deep Tendon Reflexes: Reflexes normal.   Psychiatric:         Behavior: Behavior normal.         Thought Content: Thought content normal.         Right Knee Exam     Tenderness   The patient is experiencing no tenderness.     Range of Motion   Extension: normal   Flexion: normal     Tests   Penny:  Medial - negative Lateral - negative  Varus: negative Valgus: negative  Lachman:  Anterior - negative    Posterior - negative    Other   Erythema: absent  Scars: absent  Sensation: normal  Pulse: present  Swelling: moderate  Effusion: no effusion present    Comments:  + fluid in prepatellar bursa            L Inj/Asp: R supra patellar bursa on 9/13/2021 3:07 PM  Indications: pain  Details: 18 G needle, anterior approach  Medications: 10 mg triamcinolone acetonide 40 MG/ML; 0.5 mL lidocaine 1 % (Kenalog 40 mg/ml  ndc [address]-1, lot [id number], exp 11/22    Lidocaine 1% ndc: 44014-917-87, batch [id number], exp 2/23)  Aspirate: bloody  Outcome: tolerated well, no immediate complications  Procedure, treatment alternatives, risks and benefits  explained, specific risks discussed. Consent was given by the patient. Immediately prior to procedure a time out was called to verify the correct patient, procedure, equipment, support staff and site/side marked as required. Patient was prepped and draped in the usual sterile fashion.           1. Prepatellar bursitis of right knee      No problem-specific Assessment & Plan notes found for this encounter.      Assessment & Plan:     Pleasant 40-year-old male well-known to me presenting with patella bursitis of the right knee.  Needed to stick the knee twice as the fluid was  Loculated.  Total of 9 cc aspirated.  Injected small amount of Kenalog and lidocaine, bound with ace wrap. Explained need to keep knee tightly wrapped to prevent fluid reaccumulation. No swim / shower / bathe for 24 hours. No exercise for 48 hours. Keep clean and dry. Should not effect marathon prep.    Maggie Ahn, [facility]    PFS    This note was created using the Dragon voice recognition system. Errors in content may be related to improper recognition of the system. Effort to review and correct the note has been made but irregularities may still be present.       [phone]

## 2022-09-17 ENCOUNTER — INPATIENT (INPATIENT)
Facility: HOSPITAL | Age: 22
LOS: 11 days | Discharge: HOME | DRG: 751 | End: 2022-09-29
Attending: INTERNAL MEDICINE | Admitting: INTERNAL MEDICINE
Payer: MEDICAID

## 2022-09-17 VITALS
HEART RATE: 126 BPM | HEIGHT: 68 IN | RESPIRATION RATE: 18 BRPM | SYSTOLIC BLOOD PRESSURE: 148 MMHG | DIASTOLIC BLOOD PRESSURE: 90 MMHG

## 2022-09-17 PROBLEM — F20.9 SCHIZOPHRENIA, UNSPECIFIED: Chronic | Status: ACTIVE | Noted: 2021-08-16

## 2022-09-17 LAB
ALBUMIN SERPL ELPH-MCNC: 3.6 G/DL — SIGNIFICANT CHANGE UP (ref 3.3–5)
ALP SERPL-CCNC: 113 U/L — SIGNIFICANT CHANGE UP (ref 40–120)
ALT FLD-CCNC: 26 U/L — SIGNIFICANT CHANGE UP (ref 12–78)
ANION GAP SERPL CALC-SCNC: 6 MMOL/L — SIGNIFICANT CHANGE UP (ref 5–17)
APAP SERPL-MCNC: < 2 UG/ML (ref 10–30)
APPEARANCE UR: CLEAR — SIGNIFICANT CHANGE UP
AST SERPL-CCNC: 29 U/L — SIGNIFICANT CHANGE UP (ref 15–37)
BASOPHILS # BLD AUTO: 0.07 K/UL — SIGNIFICANT CHANGE UP (ref 0–0.2)
BASOPHILS # BLD AUTO: 0.11 K/UL — SIGNIFICANT CHANGE UP (ref 0–0.2)
BASOPHILS NFR BLD AUTO: 0.4 % — SIGNIFICANT CHANGE UP (ref 0–2)
BASOPHILS NFR BLD AUTO: 0.6 % — SIGNIFICANT CHANGE UP (ref 0–2)
BILIRUB SERPL-MCNC: 0.4 MG/DL — SIGNIFICANT CHANGE UP (ref 0.2–1.2)
BILIRUB UR-MCNC: NEGATIVE — SIGNIFICANT CHANGE UP
BUN SERPL-MCNC: 6 MG/DL — LOW (ref 7–23)
CALCIUM SERPL-MCNC: 9.1 MG/DL — SIGNIFICANT CHANGE UP (ref 8.5–10.1)
CHLORIDE SERPL-SCNC: 107 MMOL/L — SIGNIFICANT CHANGE UP (ref 96–108)
CO2 SERPL-SCNC: 20 MMOL/L — LOW (ref 22–31)
COLOR SPEC: YELLOW — SIGNIFICANT CHANGE UP
CREAT SERPL-MCNC: 1 MG/DL — SIGNIFICANT CHANGE UP (ref 0.5–1.3)
DIFF PNL FLD: NEGATIVE — SIGNIFICANT CHANGE UP
EGFR: 110 ML/MIN/1.73M2 — SIGNIFICANT CHANGE UP
EOSINOPHIL # BLD AUTO: 0.01 K/UL — SIGNIFICANT CHANGE UP (ref 0–0.5)
EOSINOPHIL # BLD AUTO: 0.23 K/UL — SIGNIFICANT CHANGE UP (ref 0–0.5)
EOSINOPHIL NFR BLD AUTO: 0.1 % — SIGNIFICANT CHANGE UP (ref 0–6)
EOSINOPHIL NFR BLD AUTO: 1.2 % — SIGNIFICANT CHANGE UP (ref 0–6)
ETHANOL SERPL-MCNC: <10 MG/DL — SIGNIFICANT CHANGE UP (ref 0–10)
GLUCOSE SERPL-MCNC: 128 MG/DL — HIGH (ref 70–99)
GLUCOSE UR QL: NEGATIVE — SIGNIFICANT CHANGE UP
HCT VFR BLD CALC: 45.7 % — SIGNIFICANT CHANGE UP (ref 39–50)
HCT VFR BLD CALC: 49.1 % — SIGNIFICANT CHANGE UP (ref 39–50)
HGB BLD-MCNC: 15.8 G/DL — SIGNIFICANT CHANGE UP (ref 13–17)
HGB BLD-MCNC: 17.4 G/DL — HIGH (ref 13–17)
IMM GRANULOCYTES NFR BLD AUTO: 0.6 % — SIGNIFICANT CHANGE UP (ref 0–0.9)
IMM GRANULOCYTES NFR BLD AUTO: 1.4 % — HIGH (ref 0–0.9)
KETONES UR-MCNC: NEGATIVE — SIGNIFICANT CHANGE UP
LEUKOCYTE ESTERASE UR-ACNC: NEGATIVE — SIGNIFICANT CHANGE UP
LYMPHOCYTES # BLD AUTO: 0.97 K/UL — LOW (ref 1–3.3)
LYMPHOCYTES # BLD AUTO: 1.76 K/UL — SIGNIFICANT CHANGE UP (ref 1–3.3)
LYMPHOCYTES # BLD AUTO: 4.9 % — LOW (ref 13–44)
LYMPHOCYTES # BLD AUTO: 9.2 % — LOW (ref 13–44)
MCHC RBC-ENTMCNC: 29.7 PG — SIGNIFICANT CHANGE UP (ref 27–34)
MCHC RBC-ENTMCNC: 30.4 PG — SIGNIFICANT CHANGE UP (ref 27–34)
MCHC RBC-ENTMCNC: 34.6 GM/DL — SIGNIFICANT CHANGE UP (ref 32–36)
MCHC RBC-ENTMCNC: 35.4 GM/DL — SIGNIFICANT CHANGE UP (ref 32–36)
MCV RBC AUTO: 85.7 FL — SIGNIFICANT CHANGE UP (ref 80–100)
MCV RBC AUTO: 85.9 FL — SIGNIFICANT CHANGE UP (ref 80–100)
MONOCYTES # BLD AUTO: 0.97 K/UL — HIGH (ref 0–0.9)
MONOCYTES # BLD AUTO: 1.23 K/UL — HIGH (ref 0–0.9)
MONOCYTES NFR BLD AUTO: 4.9 % — SIGNIFICANT CHANGE UP (ref 2–14)
MONOCYTES NFR BLD AUTO: 6.4 % — SIGNIFICANT CHANGE UP (ref 2–14)
NEUTROPHILS # BLD AUTO: 15.52 K/UL — HIGH (ref 1.8–7.4)
NEUTROPHILS # BLD AUTO: 17.7 K/UL — HIGH (ref 1.8–7.4)
NEUTROPHILS NFR BLD AUTO: 81.2 % — HIGH (ref 43–77)
NEUTROPHILS NFR BLD AUTO: 89.1 % — HIGH (ref 43–77)
NITRITE UR-MCNC: NEGATIVE — SIGNIFICANT CHANGE UP
PCP SPEC-MCNC: SIGNIFICANT CHANGE UP
PH UR: 7 — SIGNIFICANT CHANGE UP (ref 5–8)
PLATELET # BLD AUTO: 317 K/UL — SIGNIFICANT CHANGE UP (ref 150–400)
PLATELET # BLD AUTO: 356 K/UL — SIGNIFICANT CHANGE UP (ref 150–400)
POTASSIUM SERPL-MCNC: 3.9 MMOL/L — SIGNIFICANT CHANGE UP (ref 3.5–5.3)
POTASSIUM SERPL-SCNC: 3.9 MMOL/L — SIGNIFICANT CHANGE UP (ref 3.5–5.3)
PROT SERPL-MCNC: 7.1 GM/DL — SIGNIFICANT CHANGE UP (ref 6–8.3)
PROT UR-MCNC: NEGATIVE — SIGNIFICANT CHANGE UP
RBC # BLD: 5.32 M/UL — SIGNIFICANT CHANGE UP (ref 4.2–5.8)
RBC # BLD: 5.73 M/UL — SIGNIFICANT CHANGE UP (ref 4.2–5.8)
RBC # FLD: 12.6 % — SIGNIFICANT CHANGE UP (ref 10.3–14.5)
RBC # FLD: 12.7 % — SIGNIFICANT CHANGE UP (ref 10.3–14.5)
SALICYLATES SERPL-MCNC: 9.8 MG/DL — SIGNIFICANT CHANGE UP (ref 2.8–20)
SARS-COV-2 RNA SPEC QL NAA+PROBE: SIGNIFICANT CHANGE UP
SODIUM SERPL-SCNC: 133 MMOL/L — LOW (ref 135–145)
SP GR SPEC: 1 — LOW (ref 1.01–1.02)
UROBILINOGEN FLD QL: NEGATIVE — SIGNIFICANT CHANGE UP
WBC # BLD: 19.11 K/UL — HIGH (ref 3.8–10.5)
WBC # BLD: 19.84 K/UL — HIGH (ref 3.8–10.5)
WBC # FLD AUTO: 19.11 K/UL — HIGH (ref 3.8–10.5)
WBC # FLD AUTO: 19.84 K/UL — HIGH (ref 3.8–10.5)

## 2022-09-17 PROCEDURE — 99285 EMERGENCY DEPT VISIT HI MDM: CPT

## 2022-09-17 PROCEDURE — 71045 X-RAY EXAM CHEST 1 VIEW: CPT | Mod: 26

## 2022-09-17 RX ORDER — RISPERIDONE 4 MG/1
3.5 TABLET ORAL
Refills: 0 | Status: DISCONTINUED | OUTPATIENT
Start: 2022-09-17 | End: 2022-09-21

## 2022-09-17 RX ORDER — RISPERIDONE 4 MG/1
3 TABLET ORAL ONCE
Refills: 0 | Status: COMPLETED | OUTPATIENT
Start: 2022-09-17 | End: 2022-09-17

## 2022-09-17 RX ORDER — HALOPERIDOL DECANOATE 100 MG/ML
5 INJECTION INTRAMUSCULAR ONCE
Refills: 0 | Status: COMPLETED | OUTPATIENT
Start: 2022-09-17 | End: 2022-09-17

## 2022-09-17 RX ORDER — SERTRALINE 25 MG/1
100 TABLET, FILM COATED ORAL DAILY
Refills: 0 | Status: DISCONTINUED | OUTPATIENT
Start: 2022-09-17 | End: 2022-09-21

## 2022-09-17 RX ORDER — DIPHENHYDRAMINE HCL 50 MG
50 CAPSULE ORAL ONCE
Refills: 0 | Status: COMPLETED | OUTPATIENT
Start: 2022-09-17 | End: 2022-09-17

## 2022-09-17 RX ORDER — TETANUS TOXOID, REDUCED DIPHTHERIA TOXOID AND ACELLULAR PERTUSSIS VACCINE, ADSORBED 5; 2.5; 8; 8; 2.5 [IU]/.5ML; [IU]/.5ML; UG/.5ML; UG/.5ML; UG/.5ML
0.5 SUSPENSION INTRAMUSCULAR ONCE
Refills: 0 | Status: COMPLETED | OUTPATIENT
Start: 2022-09-17 | End: 2022-09-17

## 2022-09-17 RX ORDER — RISPERIDONE 4 MG/1
0.5 TABLET ORAL ONCE
Refills: 0 | Status: COMPLETED | OUTPATIENT
Start: 2022-09-17 | End: 2022-09-17

## 2022-09-17 RX ORDER — SODIUM CHLORIDE 9 MG/ML
1000 INJECTION INTRAMUSCULAR; INTRAVENOUS; SUBCUTANEOUS ONCE
Refills: 0 | Status: COMPLETED | OUTPATIENT
Start: 2022-09-17 | End: 2022-09-17

## 2022-09-17 RX ADMIN — Medication 1 MILLIGRAM(S): at 15:20

## 2022-09-17 RX ADMIN — Medication 2 MILLIGRAM(S): at 09:30

## 2022-09-17 RX ADMIN — RISPERIDONE 3 MILLIGRAM(S): 4 TABLET ORAL at 12:03

## 2022-09-17 RX ADMIN — TETANUS TOXOID, REDUCED DIPHTHERIA TOXOID AND ACELLULAR PERTUSSIS VACCINE, ADSORBED 0.5 MILLILITER(S): 5; 2.5; 8; 8; 2.5 SUSPENSION INTRAMUSCULAR at 09:57

## 2022-09-17 RX ADMIN — HALOPERIDOL DECANOATE 5 MILLIGRAM(S): 100 INJECTION INTRAMUSCULAR at 15:20

## 2022-09-17 RX ADMIN — SODIUM CHLORIDE 1000 MILLILITER(S): 9 INJECTION INTRAMUSCULAR; INTRAVENOUS; SUBCUTANEOUS at 12:50

## 2022-09-17 RX ADMIN — Medication 50 MILLIGRAM(S): at 09:30

## 2022-09-17 RX ADMIN — RISPERIDONE 0.5 MILLIGRAM(S): 4 TABLET ORAL at 12:04

## 2022-09-17 NOTE — ED ADULT NURSE REASSESSMENT NOTE - NS ED NURSE REASSESS COMMENT FT1
Pt agitated pt states "do you know who I am!!! I am a super hero!!! I will run out of here!!! I want my fucking phone" pt not able to be reoriented to calm environment, thinks he is in [facility], denies being in a hospital, pt not able to snap back into reality, security called to assist with medication administration

## 2022-09-17 NOTE — ED BEHAVIORAL HEALTH ASSESSMENT NOTE - DESCRIPTION
None Agitated and aggressive towards staff warranting 4 point restraint per nursing staff.     Vital Signs Last 24 Hrs  T(C): 36.9 (17 Sep 2022 12:39), Max: 36.9 (17 Sep 2022 10:55)  T(F): 98.4 (17 Sep 2022 12:39), Max: 98.4 (17 Sep 2022 10:55)  HR: 110 (17 Sep 2022 12:39) (110 - 126)  BP: 128/72 (17 Sep 2022 12:39) (122/79 - 148/90)  BP(mean): 90 (17 Sep 2022 10:55) (90 - 90)  RR: 18 (17 Sep 2022 12:39) (18 - 18)  SpO2: 98% (17 Sep 2022 12:39) (98% - 98%)    Parameters below as of 17 Sep 2022 12:39  Patient On (Oxygen Delivery Method): room air See HPI
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
## 2022-09-17 NOTE — ED ADULT TRIAGE NOTE - HEART RATE (BEATS/MIN)
PATIENT SITTING AT BEDSIDE DENIES ALL NEEDS O2 ON 3 LITERS. ALL SAFETY
MEASURES ARE IN PLACE AT THIS TIME. CALL LIGHT WITHIN REACH. 126
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
## 2022-09-17 NOTE — ED PROVIDER NOTE - CLINICAL SUMMARY MEDICAL DECISION MAKING FREE TEXT BOX
20 y/o male hx of schizophrenia presenting with bizarre behavior. Pt appears to be in acute decompensated psychiatric state. Will obtain psych screening labs and psych consult. In order to ensure pt and staff safety, will provide Ativan and benadryl to pt. Given abrasions will update tetanus.

## 2022-09-17 NOTE — ED PROVIDER NOTE - PHYSICAL EXAMINATION
Gen: NAD, AOx3, able to make needs known, non-toxic  Head: NCAT  HEENT: EOMI, oral mucosa moist, normal conjunctiva  Lung: CTAB, no respiratory distress, no wheezes/rhonchi/rales B/L, speaking in full sentences  CV: RRR, no murmurs  Abd: non distended, soft, nontender, no guarding, no CVA tenderness  MSK: no visible deformities  Neuro: Appears non focal  Skin: Warm, well perfused, no rash. +scattered abrasions on extremities  Psych: poor eye contact, bizarre speech, internally preoccupied

## 2022-09-17 NOTE — ED BEHAVIORAL HEALTH ASSESSMENT NOTE - DETAILS
Spoke to patient's mother about the plan for psychiatric admission. Patient did not acknowledge this screening despite opening his eyes briefly. Pending bed assignment
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
## 2022-09-17 NOTE — ED ADULT TRIAGE NOTE - HISTORY OF COVID-19 VACCINATION
Vaccine status unknown
Atrial fibrillation and flutter  not on anticoagulants due to GI bleed  Diverticulitis of Colon  with Hx of colostomy bag  Emphysema    History of anxiety    HTN (Hypertension)    Prostate Cancer  with seeds implant

## 2022-09-17 NOTE — ED PROVIDER NOTE - NS_ ATTENDINGSCRIBEDETAILS _ED_A_ED_FT
I, Felipe Nance DO,  performed the initial face to face bedside interview with this patient regarding history of present illness, review of symptoms and relevant past medical, social and family history.  I completed an independent physical examination.  I was the initial provider who evaluated this patient.   I personally saw the patient and performed a substantive portion of the visit including all aspects of the medical decision making.  I have signed out the follow up of any pending tests (i.e. labs, radiological studies) to the MIRANDA.  I have communicated the patient’s plan of care and disposition with the MIRANDA.  The history, relevant review of systems, past medical and surgical history, medical decision making, and physical examination was documented by the scribe in my presence and I attest to the accuracy of the documentation. Attending Lee Ann: The scribe's documentation has been prepared under my direction and personally reviewed by me in its entirety. I confirm that the note above accurately reflects all work, treatment, procedures, and medical decision making performed by me.

## 2022-09-17 NOTE — ED ADULT NURSE REASSESSMENT NOTE - NS ED NURSE REASSESS COMMENT FT1
Received pt from prior RN, pt is a&0x4, airway patent, does not show any s/s of respiratory distress, breathing is unlabored, color is culturally appropriate,  vs are WNL, pt restraints almost off, pt is calm and shows readiness to have restraints d/c, pt requesting meal, diet order in place, pt ordering breakfast at this time, 1:1 at bedside, behavior health RN Rachel at bedside constantly observing pt, security also at beside
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
## 2022-09-17 NOTE — ED BEHAVIORAL HEALTH NOTE - BEHAVIORAL HEALTH NOTE
Psych Yamil called surrounding facilities for bed availability if Pt will need inpatient admission.   At this time [facility] (Mis) reports no beds for the weekend, [facility] (Natalie) reports they have beds and will review Pts chart, [facility] will follow up with [facility]res L[facility] once reviewed.  Psych NP made aware.

## 2022-09-17 NOTE — ED BEHAVIORAL HEALTH NOTE - BEHAVIORAL HEALTH NOTE
Psych Sw followed up with [facility], Pt review still in process, concern of elevated WBC. Will handoff to telepsych. ED aware, Pts family aware.

## 2022-09-17 NOTE — ED BEHAVIORAL HEALTH NOTE - BEHAVIORAL HEALTH NOTE
Psych Sw followed up with [facility], they are waiting for updated Psych notes to review for possible admission. RN at [facility] also reports Pt needs new CBC for elevated WBC and vitals. Once order completed Psych Sw will follow up with [facility]. EKG faxed ([phone])   Psych NP, Psych MD, ED RN Margie LEON made aware.
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
## 2022-09-17 NOTE — ED ADULT NURSE REASSESSMENT NOTE - DESCRIPTION
Patient continues to refuse VS and po hs medication despite encouragement of staff. 1:1 Constant observation maintained for safety.
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
## 2022-09-17 NOTE — ED BEHAVIORAL HEALTH ASSESSMENT NOTE - OTHER PAST PSYCHIATRIC HISTORY (INCLUDE DETAILS REGARDING ONSET, COURSE OF ILLNESS, INPATIENT/OUTPATIENT TREATMENT)
History of past psychiatric admission since age 15yo when he first got diagnosed with schizophrenia per his mother.

## 2022-09-17 NOTE — ED PROVIDER NOTE - NS ED ROS FT
Gen: No fever, normal appetite  Eyes: No eye irritation or discharge  ENT: No ear pain, congestion, sore throat  Resp: No cough or trouble breathing  Cardiovascular: No chest pain or palpitation  Gastroenteric: No nausea/vomiting, diarrhea, constipation  :  No change in urine output; no dysuria  MS: No joint or muscle pain  Skin: No rashes  Neuro: No headache; no abnormal movements  Psych: +bizarre behavior  Remainder negative, except as per the HPI GENERAL: No fever or chills  EYES: No change in vision  HEENT: No trouble swallowing or speaking  CARDIAC: No chest pain  PULMONARY: No cough or SOB  GI: No abdominal pain, no nausea or no vomiting, no diarrhea or constipation  : No changes in urination  SKIN: No rashes  NEURO: No headache, no numbness  PSYCH: +bizarre behavior  MSK: No joint pain  Otherwise as HPI or negative.

## 2022-09-18 DIAGNOSIS — F20.9 SCHIZOPHRENIA, UNSPECIFIED: ICD-10-CM

## 2022-09-18 LAB
ALBUMIN SERPL ELPH-MCNC: 3.7 G/DL — SIGNIFICANT CHANGE UP (ref 3.3–5)
ALP SERPL-CCNC: 111 U/L — SIGNIFICANT CHANGE UP (ref 40–120)
ALT FLD-CCNC: 38 U/L — SIGNIFICANT CHANGE UP (ref 12–78)
ANION GAP SERPL CALC-SCNC: 5 MMOL/L — SIGNIFICANT CHANGE UP (ref 5–17)
AST SERPL-CCNC: 101 U/L — HIGH (ref 15–37)
BASE EXCESS BLDA CALC-SCNC: -4.4 MMOL/L — LOW (ref -2–3)
BASOPHILS # BLD AUTO: 0.05 K/UL — SIGNIFICANT CHANGE UP (ref 0–0.2)
BASOPHILS NFR BLD AUTO: 0.2 % — SIGNIFICANT CHANGE UP (ref 0–2)
BILIRUB SERPL-MCNC: 0.6 MG/DL — SIGNIFICANT CHANGE UP (ref 0.2–1.2)
BLOOD GAS COMMENTS ARTERIAL: SIGNIFICANT CHANGE UP
BUN SERPL-MCNC: 11 MG/DL — SIGNIFICANT CHANGE UP (ref 7–23)
CALCIUM SERPL-MCNC: 9.2 MG/DL — SIGNIFICANT CHANGE UP (ref 8.5–10.1)
CHLORIDE SERPL-SCNC: 109 MMOL/L — HIGH (ref 96–108)
CK SERPL-CCNC: 5501 U/L — HIGH (ref 26–308)
CO2 BLDA-SCNC: 20 MMOL/L — SIGNIFICANT CHANGE UP (ref 19–24)
CO2 SERPL-SCNC: 24 MMOL/L — SIGNIFICANT CHANGE UP (ref 22–31)
CREAT SERPL-MCNC: 1.04 MG/DL — SIGNIFICANT CHANGE UP (ref 0.5–1.3)
CULTURE RESULTS: NO GROWTH — SIGNIFICANT CHANGE UP
EGFR: 105 ML/MIN/1.73M2 — SIGNIFICANT CHANGE UP
EOSINOPHIL # BLD AUTO: 0.04 K/UL — SIGNIFICANT CHANGE UP (ref 0–0.5)
EOSINOPHIL NFR BLD AUTO: 0.2 % — SIGNIFICANT CHANGE UP (ref 0–6)
GLUCOSE SERPL-MCNC: 86 MG/DL — SIGNIFICANT CHANGE UP (ref 70–99)
HCO3 BLDA-SCNC: 19 MMOL/L — LOW (ref 21–28)
HCT VFR BLD CALC: 46.8 % — SIGNIFICANT CHANGE UP (ref 39–50)
HGB BLD-MCNC: 16 G/DL — SIGNIFICANT CHANGE UP (ref 13–17)
IMM GRANULOCYTES NFR BLD AUTO: 0.5 % — SIGNIFICANT CHANGE UP (ref 0–0.9)
LACTATE SERPL-SCNC: 1.2 MMOL/L — SIGNIFICANT CHANGE UP (ref 0.7–2)
LYMPHOCYTES # BLD AUTO: 11.7 % — LOW (ref 13–44)
LYMPHOCYTES # BLD AUTO: 2.5 K/UL — SIGNIFICANT CHANGE UP (ref 1–3.3)
MAGNESIUM SERPL-MCNC: 2.8 MG/DL — HIGH (ref 1.6–2.6)
MCHC RBC-ENTMCNC: 29.3 PG — SIGNIFICANT CHANGE UP (ref 27–34)
MCHC RBC-ENTMCNC: 34.2 GM/DL — SIGNIFICANT CHANGE UP (ref 32–36)
MCV RBC AUTO: 85.7 FL — SIGNIFICANT CHANGE UP (ref 80–100)
MONOCYTES # BLD AUTO: 2.29 K/UL — HIGH (ref 0–0.9)
MONOCYTES NFR BLD AUTO: 10.7 % — SIGNIFICANT CHANGE UP (ref 2–14)
NEUTROPHILS # BLD AUTO: 16.39 K/UL — HIGH (ref 1.8–7.4)
NEUTROPHILS NFR BLD AUTO: 76.7 % — SIGNIFICANT CHANGE UP (ref 43–77)
PCO2 BLDA: 32 MMHG — LOW (ref 35–48)
PH BLDA: 7.39 — SIGNIFICANT CHANGE UP (ref 7.35–7.45)
PHOSPHATE SERPL-MCNC: 2.3 MG/DL — LOW (ref 2.5–4.5)
PLATELET # BLD AUTO: 311 K/UL — SIGNIFICANT CHANGE UP (ref 150–400)
PO2 BLDA: 196 MMHG — HIGH (ref 83–108)
POTASSIUM SERPL-MCNC: 3.6 MMOL/L — SIGNIFICANT CHANGE UP (ref 3.5–5.3)
POTASSIUM SERPL-SCNC: 3.6 MMOL/L — SIGNIFICANT CHANGE UP (ref 3.5–5.3)
PROT SERPL-MCNC: 6.9 GM/DL — SIGNIFICANT CHANGE UP (ref 6–8.3)
RBC # BLD: 5.46 M/UL — SIGNIFICANT CHANGE UP (ref 4.2–5.8)
RBC # FLD: 13 % — SIGNIFICANT CHANGE UP (ref 10.3–14.5)
SAO2 % BLDA: 100 % — SIGNIFICANT CHANGE UP
SODIUM SERPL-SCNC: 138 MMOL/L — SIGNIFICANT CHANGE UP (ref 135–145)
SPECIMEN SOURCE: SIGNIFICANT CHANGE UP
WBC # BLD: 21.38 K/UL — HIGH (ref 3.8–10.5)
WBC # FLD AUTO: 21.38 K/UL — HIGH (ref 3.8–10.5)

## 2022-09-18 PROCEDURE — 71045 X-RAY EXAM CHEST 1 VIEW: CPT | Mod: 26

## 2022-09-18 PROCEDURE — 80048 BASIC METABOLIC PNL TOTAL CA: CPT

## 2022-09-18 PROCEDURE — 70450 CT HEAD/BRAIN W/O DYE: CPT | Mod: 26

## 2022-09-18 PROCEDURE — 84100 ASSAY OF PHOSPHORUS: CPT

## 2022-09-18 PROCEDURE — 85027 COMPLETE CBC AUTOMATED: CPT

## 2022-09-18 PROCEDURE — 72125 CT NECK SPINE W/O DYE: CPT | Mod: MA

## 2022-09-18 PROCEDURE — 83605 ASSAY OF LACTIC ACID: CPT

## 2022-09-18 PROCEDURE — 36415 COLL VENOUS BLD VENIPUNCTURE: CPT

## 2022-09-18 PROCEDURE — 76376 3D RENDER W/INTRP POSTPROCES: CPT | Mod: 26

## 2022-09-18 PROCEDURE — 76376 3D RENDER W/INTRP POSTPROCES: CPT

## 2022-09-18 PROCEDURE — 94003 VENT MGMT INPAT SUBQ DAY: CPT

## 2022-09-18 PROCEDURE — 93005 ELECTROCARDIOGRAM TRACING: CPT

## 2022-09-18 PROCEDURE — U0005: CPT

## 2022-09-18 PROCEDURE — 84484 ASSAY OF TROPONIN QUANT: CPT

## 2022-09-18 PROCEDURE — 72125 CT NECK SPINE W/O DYE: CPT | Mod: 26

## 2022-09-18 PROCEDURE — 80053 COMPREHEN METABOLIC PANEL: CPT

## 2022-09-18 PROCEDURE — 70450 CT HEAD/BRAIN W/O DYE: CPT | Mod: MA

## 2022-09-18 PROCEDURE — 85025 COMPLETE CBC W/AUTO DIFF WBC: CPT

## 2022-09-18 PROCEDURE — U0003: CPT

## 2022-09-18 PROCEDURE — 83735 ASSAY OF MAGNESIUM: CPT

## 2022-09-18 PROCEDURE — 70486 CT MAXILLOFACIAL W/O DYE: CPT | Mod: MA

## 2022-09-18 PROCEDURE — 36600 WITHDRAWAL OF ARTERIAL BLOOD: CPT

## 2022-09-18 PROCEDURE — 80164 ASSAY DIPROPYLACETIC ACD TOT: CPT

## 2022-09-18 PROCEDURE — C9113: CPT

## 2022-09-18 PROCEDURE — 70486 CT MAXILLOFACIAL W/O DYE: CPT | Mod: 26

## 2022-09-18 PROCEDURE — 73100 X-RAY EXAM OF WRIST: CPT | Mod: RT

## 2022-09-18 PROCEDURE — 82803 BLOOD GASES ANY COMBINATION: CPT

## 2022-09-18 PROCEDURE — 82550 ASSAY OF CK (CPK): CPT

## 2022-09-18 PROCEDURE — 71045 X-RAY EXAM CHEST 1 VIEW: CPT

## 2022-09-18 RX ORDER — PROPOFOL 10 MG/ML
50 INJECTION, EMULSION INTRAVENOUS ONCE
Refills: 0 | Status: COMPLETED | OUTPATIENT
Start: 2022-09-18 | End: 2022-09-18

## 2022-09-18 RX ORDER — CHLORHEXIDINE GLUCONATE 213 G/1000ML
15 SOLUTION TOPICAL EVERY 12 HOURS
Refills: 0 | Status: DISCONTINUED | OUTPATIENT
Start: 2022-09-18 | End: 2022-09-20

## 2022-09-18 RX ORDER — PROPOFOL 10 MG/ML
40 INJECTION, EMULSION INTRAVENOUS
Qty: 1000 | Refills: 0 | Status: DISCONTINUED | OUTPATIENT
Start: 2022-09-18 | End: 2022-09-20

## 2022-09-18 RX ORDER — HEPARIN SODIUM 5000 [USP'U]/ML
5000 INJECTION INTRAVENOUS; SUBCUTANEOUS EVERY 8 HOURS
Refills: 0 | Status: DISCONTINUED | OUTPATIENT
Start: 2022-09-18 | End: 2022-09-27

## 2022-09-18 RX ORDER — HALOPERIDOL DECANOATE 100 MG/ML
5 INJECTION INTRAMUSCULAR ONCE
Refills: 0 | Status: COMPLETED | OUTPATIENT
Start: 2022-09-18 | End: 2022-09-18

## 2022-09-18 RX ORDER — POTASSIUM PHOSPHATE, MONOBASIC POTASSIUM PHOSPHATE, DIBASIC 236; 224 MG/ML; MG/ML
15 INJECTION, SOLUTION INTRAVENOUS ONCE
Refills: 0 | Status: COMPLETED | OUTPATIENT
Start: 2022-09-18 | End: 2022-09-18

## 2022-09-18 RX ORDER — PANTOPRAZOLE SODIUM 20 MG/1
40 TABLET, DELAYED RELEASE ORAL DAILY
Refills: 0 | Status: DISCONTINUED | OUTPATIENT
Start: 2022-09-18 | End: 2022-09-23

## 2022-09-18 RX ORDER — FENTANYL CITRATE 50 UG/ML
0.5 INJECTION INTRAVENOUS
Qty: 2500 | Refills: 0 | Status: DISCONTINUED | OUTPATIENT
Start: 2022-09-18 | End: 2022-09-20

## 2022-09-18 RX ORDER — ETOMIDATE 2 MG/ML
20 INJECTION INTRAVENOUS ONCE
Refills: 0 | Status: COMPLETED | OUTPATIENT
Start: 2022-09-18 | End: 2022-09-18

## 2022-09-18 RX ORDER — DIPHENHYDRAMINE HCL 50 MG
50 CAPSULE ORAL ONCE
Refills: 0 | Status: COMPLETED | OUTPATIENT
Start: 2022-09-18 | End: 2022-09-18

## 2022-09-18 RX ORDER — SODIUM CHLORIDE 9 MG/ML
1000 INJECTION, SOLUTION INTRAVENOUS
Refills: 0 | Status: DISCONTINUED | OUTPATIENT
Start: 2022-09-18 | End: 2022-09-20

## 2022-09-18 RX ORDER — PROPOFOL 10 MG/ML
80 INJECTION, EMULSION INTRAVENOUS
Qty: 500 | Refills: 0 | Status: DISCONTINUED | OUTPATIENT
Start: 2022-09-18 | End: 2022-09-18

## 2022-09-18 RX ORDER — CHLORHEXIDINE GLUCONATE 213 G/1000ML
1 SOLUTION TOPICAL
Refills: 0 | Status: DISCONTINUED | OUTPATIENT
Start: 2022-09-18 | End: 2022-09-20

## 2022-09-18 RX ORDER — SUCCINYLCHOLINE CHLORIDE 100 MG/5ML
100 SYRINGE (ML) INTRAVENOUS ONCE
Refills: 0 | Status: COMPLETED | OUTPATIENT
Start: 2022-09-18 | End: 2022-09-18

## 2022-09-18 RX ORDER — DEXMEDETOMIDINE HYDROCHLORIDE IN 0.9% SODIUM CHLORIDE 4 UG/ML
1 INJECTION INTRAVENOUS
Qty: 200 | Refills: 0 | Status: DISCONTINUED | OUTPATIENT
Start: 2022-09-18 | End: 2022-09-22

## 2022-09-18 RX ORDER — MIDAZOLAM HYDROCHLORIDE 1 MG/ML
5 INJECTION, SOLUTION INTRAMUSCULAR; INTRAVENOUS ONCE
Refills: 0 | Status: DISCONTINUED | OUTPATIENT
Start: 2022-09-18 | End: 2022-09-18

## 2022-09-18 RX ORDER — CHLORPROMAZINE HCL 10 MG
50 TABLET ORAL ONCE
Refills: 0 | Status: COMPLETED | OUTPATIENT
Start: 2022-09-18 | End: 2022-09-18

## 2022-09-18 RX ORDER — MIDAZOLAM HYDROCHLORIDE 1 MG/ML
0.02 INJECTION, SOLUTION INTRAMUSCULAR; INTRAVENOUS
Qty: 100 | Refills: 0 | Status: DISCONTINUED | OUTPATIENT
Start: 2022-09-18 | End: 2022-09-20

## 2022-09-18 RX ORDER — DEXMEDETOMIDINE HYDROCHLORIDE IN 0.9% SODIUM CHLORIDE 4 UG/ML
1 INJECTION INTRAVENOUS
Qty: 200 | Refills: 0 | Status: DISCONTINUED | OUTPATIENT
Start: 2022-09-18 | End: 2022-09-18

## 2022-09-18 RX ADMIN — Medication 100 MILLIGRAM(S): at 08:54

## 2022-09-18 RX ADMIN — DEXMEDETOMIDINE HYDROCHLORIDE IN 0.9% SODIUM CHLORIDE 17 MICROGRAM(S)/KG/HR: 4 INJECTION INTRAVENOUS at 18:23

## 2022-09-18 RX ADMIN — ETOMIDATE 20 MILLIGRAM(S): 2 INJECTION INTRAVENOUS at 08:54

## 2022-09-18 RX ADMIN — FENTANYL CITRATE 3.4 MICROGRAM(S)/KG/HR: 50 INJECTION INTRAVENOUS at 09:45

## 2022-09-18 RX ADMIN — SODIUM CHLORIDE 125 MILLILITER(S): 9 INJECTION, SOLUTION INTRAVENOUS at 23:35

## 2022-09-18 RX ADMIN — DEXMEDETOMIDINE HYDROCHLORIDE IN 0.9% SODIUM CHLORIDE 17 MICROGRAM(S)/KG/HR: 4 INJECTION INTRAVENOUS at 21:57

## 2022-09-18 RX ADMIN — PROPOFOL 50 MILLIGRAM(S): 10 INJECTION, EMULSION INTRAVENOUS at 09:58

## 2022-09-18 RX ADMIN — FENTANYL CITRATE 3.4 MICROGRAM(S)/KG/HR: 50 INJECTION INTRAVENOUS at 17:24

## 2022-09-18 RX ADMIN — HEPARIN SODIUM 5000 UNIT(S): 5000 INJECTION INTRAVENOUS; SUBCUTANEOUS at 13:29

## 2022-09-18 RX ADMIN — HALOPERIDOL DECANOATE 5 MILLIGRAM(S): 100 INJECTION INTRAMUSCULAR at 05:00

## 2022-09-18 RX ADMIN — MIDAZOLAM HYDROCHLORIDE 1.36 MG/KG/HR: 1 INJECTION, SOLUTION INTRAMUSCULAR; INTRAVENOUS at 10:04

## 2022-09-18 RX ADMIN — HEPARIN SODIUM 5000 UNIT(S): 5000 INJECTION INTRAVENOUS; SUBCUTANEOUS at 21:57

## 2022-09-18 RX ADMIN — CHLORHEXIDINE GLUCONATE 15 MILLILITER(S): 213 SOLUTION TOPICAL at 22:28

## 2022-09-18 RX ADMIN — Medication 2 MILLIGRAM(S): at 07:16

## 2022-09-18 RX ADMIN — MIDAZOLAM HYDROCHLORIDE 5 MILLIGRAM(S): 1 INJECTION, SOLUTION INTRAMUSCULAR; INTRAVENOUS at 09:15

## 2022-09-18 RX ADMIN — HALOPERIDOL DECANOATE 5 MILLIGRAM(S): 100 INJECTION INTRAMUSCULAR at 07:15

## 2022-09-18 RX ADMIN — DEXMEDETOMIDINE HYDROCHLORIDE IN 0.9% SODIUM CHLORIDE 17 MICROGRAM(S)/KG/HR: 4 INJECTION INTRAVENOUS at 13:23

## 2022-09-18 RX ADMIN — Medication 50 MILLIGRAM(S): at 07:40

## 2022-09-18 RX ADMIN — PROPOFOL 32.6 MICROGRAM(S)/KG/MIN: 10 INJECTION, EMULSION INTRAVENOUS at 10:00

## 2022-09-18 RX ADMIN — DEXMEDETOMIDINE HYDROCHLORIDE IN 0.9% SODIUM CHLORIDE 17 MICROGRAM(S)/KG/HR: 4 INJECTION INTRAVENOUS at 09:47

## 2022-09-18 RX ADMIN — SODIUM CHLORIDE 125 MILLILITER(S): 9 INJECTION, SOLUTION INTRAVENOUS at 16:13

## 2022-09-18 RX ADMIN — PROPOFOL 32.6 MICROGRAM(S)/KG/MIN: 10 INJECTION, EMULSION INTRAVENOUS at 17:52

## 2022-09-18 RX ADMIN — Medication 2 MILLIGRAM(S): at 05:00

## 2022-09-18 RX ADMIN — PANTOPRAZOLE SODIUM 40 MILLIGRAM(S): 20 TABLET, DELAYED RELEASE ORAL at 16:12

## 2022-09-18 RX ADMIN — Medication 50 MILLIGRAM(S): at 05:00

## 2022-09-18 RX ADMIN — MIDAZOLAM HYDROCHLORIDE 5 MILLIGRAM(S): 1 INJECTION, SOLUTION INTRAMUSCULAR; INTRAVENOUS at 10:04

## 2022-09-18 RX ADMIN — PROPOFOL 32.6 MICROGRAM(S)/KG/MIN: 10 INJECTION, EMULSION INTRAVENOUS at 14:55

## 2022-09-18 RX ADMIN — PROPOFOL 18.9 MICROGRAM(S)/KG/MIN: 10 INJECTION, EMULSION INTRAVENOUS at 21:56

## 2022-09-18 RX ADMIN — POTASSIUM PHOSPHATE, MONOBASIC POTASSIUM PHOSPHATE, DIBASIC 62.5 MILLIMOLE(S): 236; 224 INJECTION, SOLUTION INTRAVENOUS at 16:21

## 2022-09-18 RX ADMIN — RISPERIDONE 3.5 MILLIGRAM(S): 4 TABLET ORAL at 21:57

## 2022-09-18 NOTE — PATIENT PROFILE ADULT - FALL HARM RISK - HARM RISK INTERVENTIONS
Communicate Risk of Fall with Harm to all staff/Reinforce activity limits and safety measures with patient and family/Tailored Fall Risk Interventions/Visual Cue: Yellow wristband and red socks/Bed in lowest position, wheels locked, appropriate side rails in place/Call bell, personal items and telephone in reach/Instruct patient to call for assistance before getting out of bed or chair/Non-slip footwear when patient is out of bed/[place] to call system/Physically safe environment - no spills, clutter or unnecessary equipment/Purposeful Proactive Rounding/Room/bathroom lighting operational, light cord in reach Assistance with ambulation/Assistance OOB with selected safe patient handling equipment/Communicate Risk of Fall with Harm to all staff/Reinforce activity limits and safety measures with patient and family/Review medications for side effects contributing to fall risk/Sit up slowly, dangle for a short time, stand at bedside before walking/Tailored Fall Risk Interventions/Toileting schedule using arm’s reach rule for commode and bathroom/Visual Cue: Yellow wristband and red socks/Bed in lowest position, wheels locked, appropriate side rails in place/Call bell, personal items and telephone in reach/Instruct patient to call for assistance before getting out of bed or chair/Non-slip footwear when patient is out of bed/[place] to call system/Physically safe environment - no spills, clutter or unnecessary equipment/Purposeful Proactive Rounding/Room/bathroom lighting operational, light cord in reach
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
## 2022-09-18 NOTE — PATIENT PROFILE ADULT - VISION (WITH CORRECTIVE LENSES IF THE PATIENT USUALLY WEARS THEM):
unable to assess unable to assess/Normal vision: sees adequately in most situations; can see medication labels, newsprint
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
## 2022-09-18 NOTE — ED ADULT NURSE REASSESSMENT NOTE - NS ED NURSE REASSESS COMMENT FT1
rec'd pt from Rayray GRAHAM RN at 0700 pt in the stretcher in 4 point restraints with security x 3 and 1 :1 at bedside pt is awake and making hallucination comments calling himself "spiderman", restraints checked circulation and color to all extremities monitored, pt severely aggressive and violent this AM medicated several times, at 0750 pt medication with thorazine by 0830 pt was awake in the stretcher was able to get himself out of one restraint left wrist sitting up in bed thrashing himself around Nursing Admin Catina PADRON at bedside, MD Carpio made aware ICU consulted for plan to sedate and intubate for safety, at 0840 moved pt to trauma room for intubation respiratory and security at bedside , pt intubated at 0854 with ET 7.5 25 cm at the lip, VS stable prior to intubation, at 0915 pt awake fighting the vent, medicated with versed 5mg IVP at this time and Fentanyl 50mcg IVP for pain, VS stable post medication, at 0944 CODE gray called pt awake and fighting the vent, Fentanyl and precedex gtt started as per MD orders, at 1004 pt still awake Versed 5 IVP given and versed gtt started. , pt is now resting comfortably VS stable, MD Carpio spoke with pts mother and updated mother on the events that occurred this AM. pt pending transport to CT and then to ICU 1:1 remains at bedside pt remains in 4 point restraints, MD ordered renewed at 0920  will cont to monitor

## 2022-09-18 NOTE — H&P ADULT - NSHPPHYSICALEXAM_GEN_ALL_CORE
General: Pt laying in hospital bed sedated on Vent w/ prop, fent and versed  HEENT: PERRLA, oral mucosa moist   Heart: S1 & S2  Pulm: Lung clear to ascultation B/L, no significant sputum production  Abdomen: Soft, nontender, nondistended, normoactive bowel sounds  EXT: Nonedematous, nontender  Skin: Dry and warm, multiple abrasion noted on upper and lower extremities as well as abdomen. Lip avulsion noted.
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
## 2022-09-19 LAB
ALBUMIN SERPL ELPH-MCNC: 3 G/DL — LOW (ref 3.3–5)
ALP SERPL-CCNC: 98 U/L — SIGNIFICANT CHANGE UP (ref 40–120)
ALT FLD-CCNC: 43 U/L — SIGNIFICANT CHANGE UP (ref 12–78)
ANION GAP SERPL CALC-SCNC: 8 MMOL/L — SIGNIFICANT CHANGE UP (ref 5–17)
ANION GAP SERPL CALC-SCNC: 8 MMOL/L — SIGNIFICANT CHANGE UP (ref 5–17)
AST SERPL-CCNC: 101 U/L — HIGH (ref 15–37)
BASE EXCESS BLDA CALC-SCNC: -7.5 MMOL/L — LOW (ref -2–3)
BASOPHILS # BLD AUTO: 0.08 K/UL — SIGNIFICANT CHANGE UP (ref 0–0.2)
BASOPHILS NFR BLD AUTO: 0.5 % — SIGNIFICANT CHANGE UP (ref 0–2)
BILIRUB SERPL-MCNC: 0.4 MG/DL — SIGNIFICANT CHANGE UP (ref 0.2–1.2)
BLOOD GAS COMMENTS ARTERIAL: SIGNIFICANT CHANGE UP
BUN SERPL-MCNC: 19 MG/DL — SIGNIFICANT CHANGE UP (ref 7–23)
BUN SERPL-MCNC: 20 MG/DL — SIGNIFICANT CHANGE UP (ref 7–23)
CALCIUM SERPL-MCNC: 8.7 MG/DL — SIGNIFICANT CHANGE UP (ref 8.5–10.1)
CALCIUM SERPL-MCNC: 8.7 MG/DL — SIGNIFICANT CHANGE UP (ref 8.5–10.1)
CHLORIDE SERPL-SCNC: 111 MMOL/L — HIGH (ref 96–108)
CHLORIDE SERPL-SCNC: 111 MMOL/L — HIGH (ref 96–108)
CK SERPL-CCNC: 4612 U/L — HIGH (ref 26–308)
CO2 BLDA-SCNC: 20 MMOL/L — SIGNIFICANT CHANGE UP (ref 19–24)
CO2 SERPL-SCNC: 21 MMOL/L — LOW (ref 22–31)
CO2 SERPL-SCNC: 21 MMOL/L — LOW (ref 22–31)
CREAT SERPL-MCNC: 1.26 MG/DL — SIGNIFICANT CHANGE UP (ref 0.5–1.3)
CREAT SERPL-MCNC: 1.32 MG/DL — HIGH (ref 0.5–1.3)
EGFR: 79 ML/MIN/1.73M2 — SIGNIFICANT CHANGE UP
EGFR: 83 ML/MIN/1.73M2 — SIGNIFICANT CHANGE UP
EOSINOPHIL # BLD AUTO: 0.25 K/UL — SIGNIFICANT CHANGE UP (ref 0–0.5)
EOSINOPHIL NFR BLD AUTO: 1.6 % — SIGNIFICANT CHANGE UP (ref 0–6)
GAS PNL BLDA: SIGNIFICANT CHANGE UP
GLUCOSE SERPL-MCNC: 76 MG/DL — SIGNIFICANT CHANGE UP (ref 70–99)
GLUCOSE SERPL-MCNC: 77 MG/DL — SIGNIFICANT CHANGE UP (ref 70–99)
HCO3 BLDA-SCNC: 19 MMOL/L — LOW (ref 21–28)
HCT VFR BLD CALC: 45.2 % — SIGNIFICANT CHANGE UP (ref 39–50)
HCT VFR BLD CALC: 45.6 % — SIGNIFICANT CHANGE UP (ref 39–50)
HGB BLD-MCNC: 15.2 G/DL — SIGNIFICANT CHANGE UP (ref 13–17)
HGB BLD-MCNC: 15.3 G/DL — SIGNIFICANT CHANGE UP (ref 13–17)
IMM GRANULOCYTES NFR BLD AUTO: 0.5 % — SIGNIFICANT CHANGE UP (ref 0–0.9)
LYMPHOCYTES # BLD AUTO: 17.4 % — SIGNIFICANT CHANGE UP (ref 13–44)
LYMPHOCYTES # BLD AUTO: 2.74 K/UL — SIGNIFICANT CHANGE UP (ref 1–3.3)
MAGNESIUM SERPL-MCNC: 2.5 MG/DL — SIGNIFICANT CHANGE UP (ref 1.6–2.6)
MCHC RBC-ENTMCNC: 29.7 PG — SIGNIFICANT CHANGE UP (ref 27–34)
MCHC RBC-ENTMCNC: 29.8 PG — SIGNIFICANT CHANGE UP (ref 27–34)
MCHC RBC-ENTMCNC: 33.6 GM/DL — SIGNIFICANT CHANGE UP (ref 32–36)
MCHC RBC-ENTMCNC: 33.6 GM/DL — SIGNIFICANT CHANGE UP (ref 32–36)
MCV RBC AUTO: 88.5 FL — SIGNIFICANT CHANGE UP (ref 80–100)
MCV RBC AUTO: 88.9 FL — SIGNIFICANT CHANGE UP (ref 80–100)
MONOCYTES # BLD AUTO: 1.79 K/UL — HIGH (ref 0–0.9)
MONOCYTES NFR BLD AUTO: 11.3 % — SIGNIFICANT CHANGE UP (ref 2–14)
NEUTROPHILS # BLD AUTO: 10.84 K/UL — HIGH (ref 1.8–7.4)
NEUTROPHILS NFR BLD AUTO: 68.7 % — SIGNIFICANT CHANGE UP (ref 43–77)
PCO2 BLDA: 40 MMHG — SIGNIFICANT CHANGE UP (ref 35–48)
PH BLDA: 7.28 — LOW (ref 7.35–7.45)
PHOSPHATE SERPL-MCNC: 4.8 MG/DL — HIGH (ref 2.5–4.5)
PLATELET # BLD AUTO: 254 K/UL — SIGNIFICANT CHANGE UP (ref 150–400)
PLATELET # BLD AUTO: 257 K/UL — SIGNIFICANT CHANGE UP (ref 150–400)
PO2 BLDA: 118 MMHG — HIGH (ref 83–108)
POTASSIUM SERPL-MCNC: 3.7 MMOL/L — SIGNIFICANT CHANGE UP (ref 3.5–5.3)
POTASSIUM SERPL-MCNC: 3.8 MMOL/L — SIGNIFICANT CHANGE UP (ref 3.5–5.3)
POTASSIUM SERPL-SCNC: 3.7 MMOL/L — SIGNIFICANT CHANGE UP (ref 3.5–5.3)
POTASSIUM SERPL-SCNC: 3.8 MMOL/L — SIGNIFICANT CHANGE UP (ref 3.5–5.3)
PROT SERPL-MCNC: 5.9 GM/DL — LOW (ref 6–8.3)
RBC # BLD: 5.11 M/UL — SIGNIFICANT CHANGE UP (ref 4.2–5.8)
RBC # BLD: 5.13 M/UL — SIGNIFICANT CHANGE UP (ref 4.2–5.8)
RBC # FLD: 13.3 % — SIGNIFICANT CHANGE UP (ref 10.3–14.5)
RBC # FLD: 13.4 % — SIGNIFICANT CHANGE UP (ref 10.3–14.5)
SAO2 % BLDA: 100 % — SIGNIFICANT CHANGE UP
SODIUM SERPL-SCNC: 140 MMOL/L — SIGNIFICANT CHANGE UP (ref 135–145)
SODIUM SERPL-SCNC: 140 MMOL/L — SIGNIFICANT CHANGE UP (ref 135–145)
WBC # BLD: 15.69 K/UL — HIGH (ref 3.8–10.5)
WBC # BLD: 15.78 K/UL — HIGH (ref 3.8–10.5)
WBC # FLD AUTO: 15.69 K/UL — HIGH (ref 3.8–10.5)
WBC # FLD AUTO: 15.78 K/UL — HIGH (ref 3.8–10.5)

## 2022-09-19 PROCEDURE — 99291 CRITICAL CARE FIRST HOUR: CPT

## 2022-09-19 PROCEDURE — 99232 SBSQ HOSP IP/OBS MODERATE 35: CPT

## 2022-09-19 RX ORDER — HALOPERIDOL DECANOATE 100 MG/ML
5 INJECTION INTRAMUSCULAR EVERY 6 HOURS
Refills: 0 | Status: DISCONTINUED | OUTPATIENT
Start: 2022-09-19 | End: 2022-09-21

## 2022-09-19 RX ORDER — DIPHENHYDRAMINE HCL 50 MG
50 CAPSULE ORAL EVERY 6 HOURS
Refills: 0 | Status: DISCONTINUED | OUTPATIENT
Start: 2022-09-19 | End: 2022-09-21

## 2022-09-19 RX ORDER — DIVALPROEX SODIUM 500 MG/1
250 TABLET, DELAYED RELEASE ORAL
Refills: 0 | Status: DISCONTINUED | OUTPATIENT
Start: 2022-09-19 | End: 2022-09-20

## 2022-09-19 RX ORDER — DIPHENHYDRAMINE HCL 50 MG
50 CAPSULE ORAL EVERY 6 HOURS
Refills: 0 | Status: DISCONTINUED | OUTPATIENT
Start: 2022-09-19 | End: 2022-09-27

## 2022-09-19 RX ORDER — HALOPERIDOL DECANOATE 100 MG/ML
5 INJECTION INTRAMUSCULAR EVERY 6 HOURS
Refills: 0 | Status: DISCONTINUED | OUTPATIENT
Start: 2022-09-19 | End: 2022-09-19

## 2022-09-19 RX ORDER — HALOPERIDOL DECANOATE 100 MG/ML
5 INJECTION INTRAMUSCULAR EVERY 6 HOURS
Refills: 0 | Status: COMPLETED | OUTPATIENT
Start: 2022-09-19 | End: 2022-09-19

## 2022-09-19 RX ORDER — HALOPERIDOL DECANOATE 100 MG/ML
5 INJECTION INTRAMUSCULAR EVERY 6 HOURS
Refills: 0 | Status: DISCONTINUED | OUTPATIENT
Start: 2022-09-19 | End: 2022-09-27

## 2022-09-19 RX ORDER — HALOPERIDOL DECANOATE 100 MG/ML
5 INJECTION INTRAMUSCULAR ONCE
Refills: 0 | Status: COMPLETED | OUTPATIENT
Start: 2022-09-19 | End: 2022-09-19

## 2022-09-19 RX ORDER — HALOPERIDOL DECANOATE 100 MG/ML
5 INJECTION INTRAMUSCULAR ONCE
Refills: 0 | Status: COMPLETED | OUTPATIENT
Start: 2022-09-19 | End: 2022-09-20

## 2022-09-19 RX ORDER — DIPHENHYDRAMINE HCL 50 MG
50 CAPSULE ORAL ONCE
Refills: 0 | Status: COMPLETED | OUTPATIENT
Start: 2022-09-19 | End: 2022-09-19

## 2022-09-19 RX ADMIN — DEXMEDETOMIDINE HYDROCHLORIDE IN 0.9% SODIUM CHLORIDE 17 MICROGRAM(S)/KG/HR: 4 INJECTION INTRAVENOUS at 12:57

## 2022-09-19 RX ADMIN — Medication 2 MILLIGRAM(S): at 16:15

## 2022-09-19 RX ADMIN — Medication 50 MILLIGRAM(S): at 18:36

## 2022-09-19 RX ADMIN — SERTRALINE 100 MILLIGRAM(S): 25 TABLET, FILM COATED ORAL at 10:27

## 2022-09-19 RX ADMIN — DEXMEDETOMIDINE HYDROCHLORIDE IN 0.9% SODIUM CHLORIDE 17 MICROGRAM(S)/KG/HR: 4 INJECTION INTRAVENOUS at 06:28

## 2022-09-19 RX ADMIN — Medication 50 MILLIGRAM(S): at 14:07

## 2022-09-19 RX ADMIN — HALOPERIDOL DECANOATE 5 MILLIGRAM(S): 100 INJECTION INTRAMUSCULAR at 18:36

## 2022-09-19 RX ADMIN — PROPOFOL 18.9 MICROGRAM(S)/KG/MIN: 10 INJECTION, EMULSION INTRAVENOUS at 06:28

## 2022-09-19 RX ADMIN — HEPARIN SODIUM 5000 UNIT(S): 5000 INJECTION INTRAVENOUS; SUBCUTANEOUS at 13:29

## 2022-09-19 RX ADMIN — Medication 2 MILLIGRAM(S): at 17:44

## 2022-09-19 RX ADMIN — DEXMEDETOMIDINE HYDROCHLORIDE IN 0.9% SODIUM CHLORIDE 17 MICROGRAM(S)/KG/HR: 4 INJECTION INTRAVENOUS at 19:15

## 2022-09-19 RX ADMIN — PROPOFOL 18.9 MICROGRAM(S)/KG/MIN: 10 INJECTION, EMULSION INTRAVENOUS at 10:04

## 2022-09-19 RX ADMIN — PROPOFOL 18.9 MICROGRAM(S)/KG/MIN: 10 INJECTION, EMULSION INTRAVENOUS at 00:29

## 2022-09-19 RX ADMIN — DIVALPROEX SODIUM 250 MILLIGRAM(S): 500 TABLET, DELAYED RELEASE ORAL at 18:39

## 2022-09-19 RX ADMIN — DEXMEDETOMIDINE HYDROCHLORIDE IN 0.9% SODIUM CHLORIDE 17 MICROGRAM(S)/KG/HR: 4 INJECTION INTRAVENOUS at 10:04

## 2022-09-19 RX ADMIN — Medication 2 MILLIGRAM(S): at 14:07

## 2022-09-19 RX ADMIN — HEPARIN SODIUM 5000 UNIT(S): 5000 INJECTION INTRAVENOUS; SUBCUTANEOUS at 22:36

## 2022-09-19 RX ADMIN — SODIUM CHLORIDE 125 MILLILITER(S): 9 INJECTION, SOLUTION INTRAVENOUS at 13:03

## 2022-09-19 RX ADMIN — DEXMEDETOMIDINE HYDROCHLORIDE IN 0.9% SODIUM CHLORIDE 17 MICROGRAM(S)/KG/HR: 4 INJECTION INTRAVENOUS at 01:58

## 2022-09-19 RX ADMIN — Medication 50 MILLIGRAM(S): at 16:14

## 2022-09-19 RX ADMIN — Medication 2 MILLIGRAM(S): at 21:17

## 2022-09-19 RX ADMIN — SODIUM CHLORIDE 125 MILLILITER(S): 9 INJECTION, SOLUTION INTRAVENOUS at 06:27

## 2022-09-19 RX ADMIN — HEPARIN SODIUM 5000 UNIT(S): 5000 INJECTION INTRAVENOUS; SUBCUTANEOUS at 06:32

## 2022-09-19 RX ADMIN — HALOPERIDOL DECANOATE 5 MILLIGRAM(S): 100 INJECTION INTRAMUSCULAR at 14:07

## 2022-09-19 RX ADMIN — CHLORHEXIDINE GLUCONATE 1 APPLICATION(S): 213 SOLUTION TOPICAL at 06:28

## 2022-09-19 RX ADMIN — HALOPERIDOL DECANOATE 5 MILLIGRAM(S): 100 INJECTION INTRAMUSCULAR at 16:14

## 2022-09-19 RX ADMIN — FENTANYL CITRATE 3.4 MICROGRAM(S)/KG/HR: 50 INJECTION INTRAVENOUS at 01:47

## 2022-09-19 RX ADMIN — RISPERIDONE 3.5 MILLIGRAM(S): 4 TABLET ORAL at 10:27

## 2022-09-19 RX ADMIN — DEXMEDETOMIDINE HYDROCHLORIDE IN 0.9% SODIUM CHLORIDE 17 MICROGRAM(S)/KG/HR: 4 INJECTION INTRAVENOUS at 15:46

## 2022-09-19 RX ADMIN — DEXMEDETOMIDINE HYDROCHLORIDE IN 0.9% SODIUM CHLORIDE 17 MICROGRAM(S)/KG/HR: 4 INJECTION INTRAVENOUS at 21:16

## 2022-09-19 RX ADMIN — CHLORHEXIDINE GLUCONATE 15 MILLILITER(S): 213 SOLUTION TOPICAL at 10:26

## 2022-09-19 RX ADMIN — SODIUM CHLORIDE 125 MILLILITER(S): 9 INJECTION, SOLUTION INTRAVENOUS at 22:55

## 2022-09-19 RX ADMIN — PANTOPRAZOLE SODIUM 40 MILLIGRAM(S): 20 TABLET, DELAYED RELEASE ORAL at 10:05
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
## 2022-09-19 NOTE — PHARMACOTHERAPY INTERVENTION NOTE - COMMENTS
Medication history complete, unable to verify patients Risperidone dose- patient has both 3mg and 0.5mg filled recently.  Reviewed and confirmed medication with Servando.
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
## 2022-09-20 LAB
ANION GAP SERPL CALC-SCNC: 8 MMOL/L — SIGNIFICANT CHANGE UP (ref 5–17)
BUN SERPL-MCNC: 12 MG/DL — SIGNIFICANT CHANGE UP (ref 7–23)
CALCIUM SERPL-MCNC: 9.1 MG/DL — SIGNIFICANT CHANGE UP (ref 8.5–10.1)
CHLORIDE SERPL-SCNC: 109 MMOL/L — HIGH (ref 96–108)
CK SERPL-CCNC: 6765 U/L — HIGH (ref 26–308)
CO2 SERPL-SCNC: 23 MMOL/L — SIGNIFICANT CHANGE UP (ref 22–31)
CREAT SERPL-MCNC: 1.13 MG/DL — SIGNIFICANT CHANGE UP (ref 0.5–1.3)
EGFR: 95 ML/MIN/1.73M2 — SIGNIFICANT CHANGE UP
GLUCOSE SERPL-MCNC: 81 MG/DL — SIGNIFICANT CHANGE UP (ref 70–99)
HCT VFR BLD CALC: 45.5 % — SIGNIFICANT CHANGE UP (ref 39–50)
HGB BLD-MCNC: 15.7 G/DL — SIGNIFICANT CHANGE UP (ref 13–17)
MAGNESIUM SERPL-MCNC: 1.9 MG/DL — SIGNIFICANT CHANGE UP (ref 1.6–2.6)
MCHC RBC-ENTMCNC: 29.7 PG — SIGNIFICANT CHANGE UP (ref 27–34)
MCHC RBC-ENTMCNC: 34.5 GM/DL — SIGNIFICANT CHANGE UP (ref 32–36)
MCV RBC AUTO: 86 FL — SIGNIFICANT CHANGE UP (ref 80–100)
PHOSPHATE SERPL-MCNC: 3.1 MG/DL — SIGNIFICANT CHANGE UP (ref 2.5–4.5)
PLATELET # BLD AUTO: 275 K/UL — SIGNIFICANT CHANGE UP (ref 150–400)
POTASSIUM SERPL-MCNC: 3.5 MMOL/L — SIGNIFICANT CHANGE UP (ref 3.5–5.3)
POTASSIUM SERPL-SCNC: 3.5 MMOL/L — SIGNIFICANT CHANGE UP (ref 3.5–5.3)
RBC # BLD: 5.29 M/UL — SIGNIFICANT CHANGE UP (ref 4.2–5.8)
RBC # FLD: 12.7 % — SIGNIFICANT CHANGE UP (ref 10.3–14.5)
SODIUM SERPL-SCNC: 140 MMOL/L — SIGNIFICANT CHANGE UP (ref 135–145)
WBC # BLD: 15.61 K/UL — HIGH (ref 3.8–10.5)
WBC # FLD AUTO: 15.61 K/UL — HIGH (ref 3.8–10.5)

## 2022-09-20 PROCEDURE — 99232 SBSQ HOSP IP/OBS MODERATE 35: CPT

## 2022-09-20 PROCEDURE — 99292 CRITICAL CARE ADDL 30 MIN: CPT

## 2022-09-20 PROCEDURE — 99291 CRITICAL CARE FIRST HOUR: CPT

## 2022-09-20 PROCEDURE — 93010 ELECTROCARDIOGRAM REPORT: CPT

## 2022-09-20 RX ORDER — VALPROIC ACID (AS SODIUM SALT) 250 MG/5ML
250 SOLUTION, ORAL ORAL EVERY 12 HOURS
Refills: 0 | Status: DISCONTINUED | OUTPATIENT
Start: 2022-09-20 | End: 2022-09-21

## 2022-09-20 RX ORDER — DEXTROSE MONOHYDRATE, SODIUM CHLORIDE, AND POTASSIUM CHLORIDE 50; .745; 4.5 G/1000ML; G/1000ML; G/1000ML
1000 INJECTION, SOLUTION INTRAVENOUS
Refills: 0 | Status: DISCONTINUED | OUTPATIENT
Start: 2022-09-20 | End: 2022-09-24

## 2022-09-20 RX ADMIN — HEPARIN SODIUM 5000 UNIT(S): 5000 INJECTION INTRAVENOUS; SUBCUTANEOUS at 05:54

## 2022-09-20 RX ADMIN — RISPERIDONE 3.5 MILLIGRAM(S): 4 TABLET ORAL at 22:01

## 2022-09-20 RX ADMIN — Medication 2 MILLIGRAM(S): at 13:48

## 2022-09-20 RX ADMIN — SERTRALINE 100 MILLIGRAM(S): 25 TABLET, FILM COATED ORAL at 10:02

## 2022-09-20 RX ADMIN — RISPERIDONE 3.5 MILLIGRAM(S): 4 TABLET ORAL at 00:24

## 2022-09-20 RX ADMIN — Medication 52.5 MILLIGRAM(S): at 22:01

## 2022-09-20 RX ADMIN — SODIUM CHLORIDE 125 MILLILITER(S): 9 INJECTION, SOLUTION INTRAVENOUS at 04:53

## 2022-09-20 RX ADMIN — Medication 50 MILLIGRAM(S): at 01:11

## 2022-09-20 RX ADMIN — Medication 2 MILLIGRAM(S): at 10:02

## 2022-09-20 RX ADMIN — HEPARIN SODIUM 5000 UNIT(S): 5000 INJECTION INTRAVENOUS; SUBCUTANEOUS at 22:01

## 2022-09-20 RX ADMIN — Medication 50 MILLIGRAM(S): at 12:51

## 2022-09-20 RX ADMIN — DEXMEDETOMIDINE HYDROCHLORIDE IN 0.9% SODIUM CHLORIDE 17 MICROGRAM(S)/KG/HR: 4 INJECTION INTRAVENOUS at 19:35

## 2022-09-20 RX ADMIN — Medication 2 MILLIGRAM(S): at 13:43

## 2022-09-20 RX ADMIN — DEXTROSE MONOHYDRATE, SODIUM CHLORIDE, AND POTASSIUM CHLORIDE 200 MILLILITER(S): 50; .745; 4.5 INJECTION, SOLUTION INTRAVENOUS at 20:08

## 2022-09-20 RX ADMIN — HALOPERIDOL DECANOATE 5 MILLIGRAM(S): 100 INJECTION INTRAMUSCULAR at 00:37

## 2022-09-20 RX ADMIN — RISPERIDONE 3.5 MILLIGRAM(S): 4 TABLET ORAL at 10:02

## 2022-09-20 RX ADMIN — Medication 2 MILLIGRAM(S): at 00:37

## 2022-09-20 RX ADMIN — DEXMEDETOMIDINE HYDROCHLORIDE IN 0.9% SODIUM CHLORIDE 17 MICROGRAM(S)/KG/HR: 4 INJECTION INTRAVENOUS at 16:46

## 2022-09-20 RX ADMIN — HALOPERIDOL DECANOATE 5 MILLIGRAM(S): 100 INJECTION INTRAMUSCULAR at 12:50

## 2022-09-20 RX ADMIN — DEXMEDETOMIDINE HYDROCHLORIDE IN 0.9% SODIUM CHLORIDE 17 MICROGRAM(S)/KG/HR: 4 INJECTION INTRAVENOUS at 11:01

## 2022-09-20 RX ADMIN — HEPARIN SODIUM 5000 UNIT(S): 5000 INJECTION INTRAVENOUS; SUBCUTANEOUS at 13:58

## 2022-09-20 RX ADMIN — DEXMEDETOMIDINE HYDROCHLORIDE IN 0.9% SODIUM CHLORIDE 17 MICROGRAM(S)/KG/HR: 4 INJECTION INTRAVENOUS at 22:02

## 2022-09-20 RX ADMIN — PANTOPRAZOLE SODIUM 40 MILLIGRAM(S): 20 TABLET, DELAYED RELEASE ORAL at 10:02

## 2022-09-20 RX ADMIN — DEXMEDETOMIDINE HYDROCHLORIDE IN 0.9% SODIUM CHLORIDE 17 MICROGRAM(S)/KG/HR: 4 INJECTION INTRAVENOUS at 04:52

## 2022-09-20 RX ADMIN — DEXMEDETOMIDINE HYDROCHLORIDE IN 0.9% SODIUM CHLORIDE 17 MICROGRAM(S)/KG/HR: 4 INJECTION INTRAVENOUS at 13:58

## 2022-09-20 RX ADMIN — Medication 52.5 MILLIGRAM(S): at 11:01

## 2022-09-20 RX ADMIN — DEXMEDETOMIDINE HYDROCHLORIDE IN 0.9% SODIUM CHLORIDE 17 MICROGRAM(S)/KG/HR: 4 INJECTION INTRAVENOUS at 01:13

## 2022-09-20 RX ADMIN — HALOPERIDOL DECANOATE 5 MILLIGRAM(S): 100 INJECTION INTRAMUSCULAR at 05:51

## 2022-09-20 RX ADMIN — Medication 2 MILLIGRAM(S): at 05:51

## 2022-09-20 RX ADMIN — DEXTROSE MONOHYDRATE, SODIUM CHLORIDE, AND POTASSIUM CHLORIDE 200 MILLILITER(S): 50; .745; 4.5 INJECTION, SOLUTION INTRAVENOUS at 09:46

## 2022-09-20 RX ADMIN — DEXTROSE MONOHYDRATE, SODIUM CHLORIDE, AND POTASSIUM CHLORIDE 200 MILLILITER(S): 50; .745; 4.5 INJECTION, SOLUTION INTRAVENOUS at 15:14
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
## 2022-09-20 NOTE — BH CONSULTATION LIAISON PROGRESS NOTE - NSBHFUPINTERVALHXFT_PSY_A_CORE
Pt still with periods of increased agitation and irritable mood and  attempting to get out of bed , pulling on the IV .  Pt yelling and verbally aggressive as he is threatening stalk and kill all staff. Pt also attempting to compromise his own care and attempting to detach the EKG monitoring leads,pull the IV , attempting to climb out of the bed and risk falling and self injury. Pt is impulsive, irrational , preoccupied  and irritable. Even presence of his mother is not able to decrease his agitation . Pt needed to receive prn of haldol 5mg IM, benadryl 50IM and ativan 2mg IM.  Will change the standing medications to haldol 5mg po bid as the Risperdal at 7mg total is ineffective for psychosis . Will continue with the Depakote .   Spoke with the mother about medication treatment and reviewed the use of medication and of the potential side effects .  Pt's mother is the primary care giver to the pt and she is in agreement with medication treatment .
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
## 2022-09-20 NOTE — BH CONSULTATION LIAISON PROGRESS NOTE - NSBHFUPINTERVALHXFT_PSY_A_CORE
21 year old, single, male, disabled, due to Schizophrenia (per mother diagnosed at age 16), history of multiple psychiatric hospitalizations for psychosis, history of suicidal ideations; no history of suicide attempts; history of auditory hallucinations at his baseline, takes Risperdal 3.5mg po BID and zoloft 150mg po daily in the evening, followed by psychiatrist Dr Joselito Louie of [place]. Patient was brought in by EMS after mother called 911 due to worsening psychosis and aggression towards the family dog.

## 2022-09-21 LAB
ANION GAP SERPL CALC-SCNC: 3 MMOL/L — LOW (ref 5–17)
BUN SERPL-MCNC: 4 MG/DL — LOW (ref 7–23)
CALCIUM SERPL-MCNC: 9.1 MG/DL — SIGNIFICANT CHANGE UP (ref 8.5–10.1)
CHLORIDE SERPL-SCNC: 110 MMOL/L — HIGH (ref 96–108)
CK SERPL-CCNC: 3780 U/L — HIGH (ref 26–308)
CO2 SERPL-SCNC: 28 MMOL/L — SIGNIFICANT CHANGE UP (ref 22–31)
CREAT SERPL-MCNC: 0.67 MG/DL — SIGNIFICANT CHANGE UP (ref 0.5–1.3)
EGFR: 136 ML/MIN/1.73M2 — SIGNIFICANT CHANGE UP
GLUCOSE SERPL-MCNC: 122 MG/DL — HIGH (ref 70–99)
HCT VFR BLD CALC: 42.3 % — SIGNIFICANT CHANGE UP (ref 39–50)
HGB BLD-MCNC: 14.8 G/DL — SIGNIFICANT CHANGE UP (ref 13–17)
MAGNESIUM SERPL-MCNC: 1.8 MG/DL — SIGNIFICANT CHANGE UP (ref 1.6–2.6)
MCHC RBC-ENTMCNC: 29.5 PG — SIGNIFICANT CHANGE UP (ref 27–34)
MCHC RBC-ENTMCNC: 35 GM/DL — SIGNIFICANT CHANGE UP (ref 32–36)
MCV RBC AUTO: 84.4 FL — SIGNIFICANT CHANGE UP (ref 80–100)
PHOSPHATE SERPL-MCNC: 2.3 MG/DL — LOW (ref 2.5–4.5)
PLATELET # BLD AUTO: 275 K/UL — SIGNIFICANT CHANGE UP (ref 150–400)
POTASSIUM SERPL-MCNC: 3.6 MMOL/L — SIGNIFICANT CHANGE UP (ref 3.5–5.3)
POTASSIUM SERPL-SCNC: 3.6 MMOL/L — SIGNIFICANT CHANGE UP (ref 3.5–5.3)
RBC # BLD: 5.01 M/UL — SIGNIFICANT CHANGE UP (ref 4.2–5.8)
RBC # FLD: 12.4 % — SIGNIFICANT CHANGE UP (ref 10.3–14.5)
SODIUM SERPL-SCNC: 141 MMOL/L — SIGNIFICANT CHANGE UP (ref 135–145)
WBC # BLD: 12.2 K/UL — HIGH (ref 3.8–10.5)
WBC # FLD AUTO: 12.2 K/UL — HIGH (ref 3.8–10.5)

## 2022-09-21 PROCEDURE — 99291 CRITICAL CARE FIRST HOUR: CPT

## 2022-09-21 PROCEDURE — 99232 SBSQ HOSP IP/OBS MODERATE 35: CPT

## 2022-09-21 PROCEDURE — 99221 1ST HOSP IP/OBS SF/LOW 40: CPT

## 2022-09-21 RX ORDER — HALOPERIDOL DECANOATE 100 MG/ML
5 INJECTION INTRAMUSCULAR
Refills: 0 | Status: DISCONTINUED | OUTPATIENT
Start: 2022-09-21 | End: 2022-09-26

## 2022-09-21 RX ORDER — DIVALPROEX SODIUM 500 MG/1
500 TABLET, DELAYED RELEASE ORAL
Refills: 0 | Status: DISCONTINUED | OUTPATIENT
Start: 2022-09-21 | End: 2022-09-26

## 2022-09-21 RX ORDER — HALOPERIDOL DECANOATE 100 MG/ML
5 INJECTION INTRAMUSCULAR ONCE
Refills: 0 | Status: COMPLETED | OUTPATIENT
Start: 2022-09-21 | End: 2022-09-25

## 2022-09-21 RX ORDER — DIPHENHYDRAMINE HCL 50 MG
50 CAPSULE ORAL EVERY 6 HOURS
Refills: 0 | Status: DISCONTINUED | OUTPATIENT
Start: 2022-09-21 | End: 2022-09-23

## 2022-09-21 RX ORDER — HALOPERIDOL DECANOATE 100 MG/ML
5 INJECTION INTRAMUSCULAR EVERY 6 HOURS
Refills: 0 | Status: DISCONTINUED | OUTPATIENT
Start: 2022-09-21 | End: 2022-09-23

## 2022-09-21 RX ADMIN — DEXTROSE MONOHYDRATE, SODIUM CHLORIDE, AND POTASSIUM CHLORIDE 200 MILLILITER(S): 50; .745; 4.5 INJECTION, SOLUTION INTRAVENOUS at 15:50

## 2022-09-21 RX ADMIN — DEXMEDETOMIDINE HYDROCHLORIDE IN 0.9% SODIUM CHLORIDE 17 MICROGRAM(S)/KG/HR: 4 INJECTION INTRAVENOUS at 16:48

## 2022-09-21 RX ADMIN — DEXTROSE MONOHYDRATE, SODIUM CHLORIDE, AND POTASSIUM CHLORIDE 200 MILLILITER(S): 50; .745; 4.5 INJECTION, SOLUTION INTRAVENOUS at 05:55

## 2022-09-21 RX ADMIN — DEXMEDETOMIDINE HYDROCHLORIDE IN 0.9% SODIUM CHLORIDE 17 MICROGRAM(S)/KG/HR: 4 INJECTION INTRAVENOUS at 21:29

## 2022-09-21 RX ADMIN — DEXMEDETOMIDINE HYDROCHLORIDE IN 0.9% SODIUM CHLORIDE 17 MICROGRAM(S)/KG/HR: 4 INJECTION INTRAVENOUS at 14:45

## 2022-09-21 RX ADMIN — HEPARIN SODIUM 5000 UNIT(S): 5000 INJECTION INTRAVENOUS; SUBCUTANEOUS at 05:55

## 2022-09-21 RX ADMIN — Medication 2 MILLIGRAM(S): at 21:47

## 2022-09-21 RX ADMIN — HEPARIN SODIUM 5000 UNIT(S): 5000 INJECTION INTRAVENOUS; SUBCUTANEOUS at 21:30

## 2022-09-21 RX ADMIN — HALOPERIDOL DECANOATE 5 MILLIGRAM(S): 100 INJECTION INTRAMUSCULAR at 16:11

## 2022-09-21 RX ADMIN — HEPARIN SODIUM 5000 UNIT(S): 5000 INJECTION INTRAVENOUS; SUBCUTANEOUS at 15:53

## 2022-09-21 RX ADMIN — PANTOPRAZOLE SODIUM 40 MILLIGRAM(S): 20 TABLET, DELAYED RELEASE ORAL at 10:23

## 2022-09-21 RX ADMIN — Medication 2 MILLIGRAM(S): at 11:36

## 2022-09-21 RX ADMIN — DEXMEDETOMIDINE HYDROCHLORIDE IN 0.9% SODIUM CHLORIDE 17 MICROGRAM(S)/KG/HR: 4 INJECTION INTRAVENOUS at 11:23

## 2022-09-21 RX ADMIN — DEXTROSE MONOHYDRATE, SODIUM CHLORIDE, AND POTASSIUM CHLORIDE 200 MILLILITER(S): 50; .745; 4.5 INJECTION, SOLUTION INTRAVENOUS at 01:11

## 2022-09-21 RX ADMIN — Medication 50 MILLIGRAM(S): at 11:37

## 2022-09-21 RX ADMIN — HALOPERIDOL DECANOATE 5 MILLIGRAM(S): 100 INJECTION INTRAMUSCULAR at 21:30

## 2022-09-21 RX ADMIN — Medication 50 MILLIGRAM(S): at 16:11

## 2022-09-21 RX ADMIN — DEXMEDETOMIDINE HYDROCHLORIDE IN 0.9% SODIUM CHLORIDE 17 MICROGRAM(S)/KG/HR: 4 INJECTION INTRAVENOUS at 12:32

## 2022-09-21 RX ADMIN — HALOPERIDOL DECANOATE 5 MILLIGRAM(S): 100 INJECTION INTRAMUSCULAR at 11:36

## 2022-09-21 RX ADMIN — DEXTROSE MONOHYDRATE, SODIUM CHLORIDE, AND POTASSIUM CHLORIDE 200 MILLILITER(S): 50; .745; 4.5 INJECTION, SOLUTION INTRAVENOUS at 10:58

## 2022-09-21 RX ADMIN — DEXMEDETOMIDINE HYDROCHLORIDE IN 0.9% SODIUM CHLORIDE 17 MICROGRAM(S)/KG/HR: 4 INJECTION INTRAVENOUS at 19:45

## 2022-09-21 RX ADMIN — DEXTROSE MONOHYDRATE, SODIUM CHLORIDE, AND POTASSIUM CHLORIDE 200 MILLILITER(S): 50; .745; 4.5 INJECTION, SOLUTION INTRAVENOUS at 20:44

## 2022-09-21 RX ADMIN — Medication 52.5 MILLIGRAM(S): at 10:24

## 2022-09-21 RX ADMIN — DEXMEDETOMIDINE HYDROCHLORIDE IN 0.9% SODIUM CHLORIDE 17 MICROGRAM(S)/KG/HR: 4 INJECTION INTRAVENOUS at 01:11

## 2022-09-21 RX ADMIN — RISPERIDONE 3.5 MILLIGRAM(S): 4 TABLET ORAL at 10:23

## 2022-09-21 RX ADMIN — Medication 2 MILLIGRAM(S): at 16:11

## 2022-09-21 RX ADMIN — DIVALPROEX SODIUM 500 MILLIGRAM(S): 500 TABLET, DELAYED RELEASE ORAL at 21:29

## 2022-09-21 NOTE — BH CONSULTATION LIAISON PROGRESS NOTE - NSBHFUPINTERVALHXFT_PSY_A_CORE
21 year old, single, male, disabled, due to Schizophrenia (per mother diagnosed at age 16), history of multiple psychiatric hospitalizations for psychosis, history of suicidal ideations; no history of suicide attempts; history of auditory hallucinations at his baseline, takes Risperdal 3.5mg po BID and zoloft 150mg po daily in the evening, followed by psychiatrist Dr Joselito Louie of [place]. Patient was brought in by EMS after mother called 911 due to worsening psychosis and aggression towards the family dog.

## 2022-09-21 NOTE — BH CONSULTATION LIAISON PROGRESS NOTE - ADDITIONAL DETAILS / COMMENTS
Pt is sedated, intermittently awakes and is combative then falls back asleep
Pt is obtunded 
Pt is sedated, intermittently awakes and is combative then falls back asleep

## 2022-09-22 LAB
ANION GAP SERPL CALC-SCNC: 5 MMOL/L — SIGNIFICANT CHANGE UP (ref 5–17)
BUN SERPL-MCNC: 4 MG/DL — LOW (ref 7–23)
CALCIUM SERPL-MCNC: 9.4 MG/DL — SIGNIFICANT CHANGE UP (ref 8.5–10.1)
CHLORIDE SERPL-SCNC: 108 MMOL/L — SIGNIFICANT CHANGE UP (ref 96–108)
CK SERPL-CCNC: 2347 U/L — HIGH (ref 26–308)
CO2 SERPL-SCNC: 27 MMOL/L — SIGNIFICANT CHANGE UP (ref 22–31)
CREAT SERPL-MCNC: 0.8 MG/DL — SIGNIFICANT CHANGE UP (ref 0.5–1.3)
EGFR: 129 ML/MIN/1.73M2 — SIGNIFICANT CHANGE UP
GLUCOSE SERPL-MCNC: 100 MG/DL — HIGH (ref 70–99)
HCT VFR BLD CALC: 45 % — SIGNIFICANT CHANGE UP (ref 39–50)
HGB BLD-MCNC: 15.8 G/DL — SIGNIFICANT CHANGE UP (ref 13–17)
MAGNESIUM SERPL-MCNC: 1.6 MG/DL — SIGNIFICANT CHANGE UP (ref 1.6–2.6)
MCHC RBC-ENTMCNC: 29.6 PG — SIGNIFICANT CHANGE UP (ref 27–34)
MCHC RBC-ENTMCNC: 35.1 GM/DL — SIGNIFICANT CHANGE UP (ref 32–36)
MCV RBC AUTO: 84.3 FL — SIGNIFICANT CHANGE UP (ref 80–100)
PHOSPHATE SERPL-MCNC: 2.6 MG/DL — SIGNIFICANT CHANGE UP (ref 2.5–4.5)
PLATELET # BLD AUTO: 299 K/UL — SIGNIFICANT CHANGE UP (ref 150–400)
POTASSIUM SERPL-MCNC: 3.5 MMOL/L — SIGNIFICANT CHANGE UP (ref 3.5–5.3)
POTASSIUM SERPL-SCNC: 3.5 MMOL/L — SIGNIFICANT CHANGE UP (ref 3.5–5.3)
RBC # BLD: 5.34 M/UL — SIGNIFICANT CHANGE UP (ref 4.2–5.8)
RBC # FLD: 12.4 % — SIGNIFICANT CHANGE UP (ref 10.3–14.5)
SARS-COV-2 RNA SPEC QL NAA+PROBE: SIGNIFICANT CHANGE UP
SODIUM SERPL-SCNC: 140 MMOL/L — SIGNIFICANT CHANGE UP (ref 135–145)
WBC # BLD: 11.95 K/UL — HIGH (ref 3.8–10.5)
WBC # FLD AUTO: 11.95 K/UL — HIGH (ref 3.8–10.5)

## 2022-09-22 PROCEDURE — 99291 CRITICAL CARE FIRST HOUR: CPT

## 2022-09-22 PROCEDURE — 99232 SBSQ HOSP IP/OBS MODERATE 35: CPT

## 2022-09-22 RX ORDER — DEXMEDETOMIDINE HYDROCHLORIDE IN 0.9% SODIUM CHLORIDE 4 UG/ML
1.5 INJECTION INTRAVENOUS
Qty: 400 | Refills: 0 | Status: DISCONTINUED | OUTPATIENT
Start: 2022-09-22 | End: 2022-09-22

## 2022-09-22 RX ORDER — MAGNESIUM SULFATE 500 MG/ML
1 VIAL (ML) INJECTION ONCE
Refills: 0 | Status: COMPLETED | OUTPATIENT
Start: 2022-09-22 | End: 2022-09-22

## 2022-09-22 RX ADMIN — HALOPERIDOL DECANOATE 5 MILLIGRAM(S): 100 INJECTION INTRAMUSCULAR at 21:52

## 2022-09-22 RX ADMIN — DIVALPROEX SODIUM 500 MILLIGRAM(S): 500 TABLET, DELAYED RELEASE ORAL at 09:23

## 2022-09-22 RX ADMIN — HEPARIN SODIUM 5000 UNIT(S): 5000 INJECTION INTRAVENOUS; SUBCUTANEOUS at 21:23

## 2022-09-22 RX ADMIN — Medication 2 MILLIGRAM(S): at 09:57

## 2022-09-22 RX ADMIN — HALOPERIDOL DECANOATE 5 MILLIGRAM(S): 100 INJECTION INTRAMUSCULAR at 09:23

## 2022-09-22 RX ADMIN — Medication 100 GRAM(S): at 10:09

## 2022-09-22 RX ADMIN — DEXTROSE MONOHYDRATE, SODIUM CHLORIDE, AND POTASSIUM CHLORIDE 200 MILLILITER(S): 50; .745; 4.5 INJECTION, SOLUTION INTRAVENOUS at 05:59

## 2022-09-22 RX ADMIN — Medication 2 MILLIGRAM(S): at 18:38

## 2022-09-22 RX ADMIN — DEXTROSE MONOHYDRATE, SODIUM CHLORIDE, AND POTASSIUM CHLORIDE 200 MILLILITER(S): 50; .745; 4.5 INJECTION, SOLUTION INTRAVENOUS at 17:35

## 2022-09-22 RX ADMIN — PANTOPRAZOLE SODIUM 40 MILLIGRAM(S): 20 TABLET, DELAYED RELEASE ORAL at 09:23

## 2022-09-22 RX ADMIN — DEXTROSE MONOHYDRATE, SODIUM CHLORIDE, AND POTASSIUM CHLORIDE 200 MILLILITER(S): 50; .745; 4.5 INJECTION, SOLUTION INTRAVENOUS at 01:12

## 2022-09-22 RX ADMIN — HALOPERIDOL DECANOATE 5 MILLIGRAM(S): 100 INJECTION INTRAMUSCULAR at 18:13

## 2022-09-22 RX ADMIN — HEPARIN SODIUM 5000 UNIT(S): 5000 INJECTION INTRAVENOUS; SUBCUTANEOUS at 05:59

## 2022-09-22 RX ADMIN — Medication 2 MILLIGRAM(S): at 21:52

## 2022-09-22 RX ADMIN — DEXMEDETOMIDINE HYDROCHLORIDE IN 0.9% SODIUM CHLORIDE 17 MICROGRAM(S)/KG/HR: 4 INJECTION INTRAVENOUS at 01:12

## 2022-09-22 RX ADMIN — DEXTROSE MONOHYDRATE, SODIUM CHLORIDE, AND POTASSIUM CHLORIDE 200 MILLILITER(S): 50; .745; 4.5 INJECTION, SOLUTION INTRAVENOUS at 12:19

## 2022-09-22 NOTE — BH CONSULTATION LIAISON PROGRESS NOTE - NSBHFUPINTERVALHXFT_PSY_A_CORE
9/18/22: 21 year old, single, male, disabled, due to Schizophrenia (per mother diagnosed at age 16), history of multiple psychiatric hospitalizations for psychosis, history of suicidal ideations; no history of suicide attempts; history of auditory hallucinations at his baseline, home medications: Risperdal 3.5mg po BID and zoloft 150 mg po daily, followed by psychiatrist Dr Joselito Louie of [place]. Patient was brought in by EMS after mother called 911 due to worsening psychosis and aggression towards the family dog.
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
## 2022-09-23 LAB
ANION GAP SERPL CALC-SCNC: 5 MMOL/L — SIGNIFICANT CHANGE UP (ref 5–17)
BUN SERPL-MCNC: 8 MG/DL — SIGNIFICANT CHANGE UP (ref 7–23)
CALCIUM SERPL-MCNC: 9.8 MG/DL — SIGNIFICANT CHANGE UP (ref 8.5–10.1)
CHLORIDE SERPL-SCNC: 103 MMOL/L — SIGNIFICANT CHANGE UP (ref 96–108)
CK SERPL-CCNC: 1803 U/L — HIGH (ref 26–308)
CO2 SERPL-SCNC: 25 MMOL/L — SIGNIFICANT CHANGE UP (ref 22–31)
CREAT SERPL-MCNC: 0.85 MG/DL — SIGNIFICANT CHANGE UP (ref 0.5–1.3)
EGFR: 127 ML/MIN/1.73M2 — SIGNIFICANT CHANGE UP
GLUCOSE SERPL-MCNC: 124 MG/DL — HIGH (ref 70–99)
HCT VFR BLD CALC: 48 % — SIGNIFICANT CHANGE UP (ref 39–50)
HGB BLD-MCNC: 17 G/DL — SIGNIFICANT CHANGE UP (ref 13–17)
MAGNESIUM SERPL-MCNC: 1.8 MG/DL — SIGNIFICANT CHANGE UP (ref 1.6–2.6)
MCHC RBC-ENTMCNC: 29.9 PG — SIGNIFICANT CHANGE UP (ref 27–34)
MCHC RBC-ENTMCNC: 35.4 GM/DL — SIGNIFICANT CHANGE UP (ref 32–36)
MCV RBC AUTO: 84.4 FL — SIGNIFICANT CHANGE UP (ref 80–100)
PHOSPHATE SERPL-MCNC: 2.7 MG/DL — SIGNIFICANT CHANGE UP (ref 2.5–4.5)
PLATELET # BLD AUTO: 219 K/UL — SIGNIFICANT CHANGE UP (ref 150–400)
POTASSIUM SERPL-MCNC: 3.7 MMOL/L — SIGNIFICANT CHANGE UP (ref 3.5–5.3)
POTASSIUM SERPL-SCNC: 3.7 MMOL/L — SIGNIFICANT CHANGE UP (ref 3.5–5.3)
RBC # BLD: 5.69 M/UL — SIGNIFICANT CHANGE UP (ref 4.2–5.8)
RBC # FLD: 12.5 % — SIGNIFICANT CHANGE UP (ref 10.3–14.5)
SODIUM SERPL-SCNC: 133 MMOL/L — LOW (ref 135–145)
WBC # BLD: 15.05 K/UL — HIGH (ref 3.8–10.5)
WBC # FLD AUTO: 15.05 K/UL — HIGH (ref 3.8–10.5)

## 2022-09-23 PROCEDURE — 99233 SBSQ HOSP IP/OBS HIGH 50: CPT

## 2022-09-23 PROCEDURE — 73100 X-RAY EXAM OF WRIST: CPT | Mod: 26,RT

## 2022-09-23 PROCEDURE — 99232 SBSQ HOSP IP/OBS MODERATE 35: CPT

## 2022-09-23 RX ORDER — DIPHENHYDRAMINE HCL 50 MG
50 CAPSULE ORAL ONCE
Refills: 0 | Status: DISCONTINUED | OUTPATIENT
Start: 2022-09-23 | End: 2022-09-29

## 2022-09-23 RX ORDER — HALOPERIDOL DECANOATE 100 MG/ML
5 INJECTION INTRAMUSCULAR ONCE
Refills: 0 | Status: COMPLETED | OUTPATIENT
Start: 2022-09-23 | End: 2022-09-25

## 2022-09-23 RX ADMIN — Medication 2 MILLIGRAM(S): at 09:45

## 2022-09-23 RX ADMIN — DIVALPROEX SODIUM 500 MILLIGRAM(S): 500 TABLET, DELAYED RELEASE ORAL at 09:48

## 2022-09-23 RX ADMIN — Medication 2 MILLIGRAM(S): at 15:24

## 2022-09-23 RX ADMIN — HALOPERIDOL DECANOATE 5 MILLIGRAM(S): 100 INJECTION INTRAMUSCULAR at 09:45
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
## 2022-09-23 NOTE — ED BEHAVIORAL HEALTH NOTE - BEHAVIORAL HEALTH NOTE
Working with [facility] (formerly [facility]) on possible transfer of patient. Discussed case with Dr. Pino, who is temporarily working at [facility].  SO administration and Dr. Pino discussed the case and feel the patient would be accepted by Monday if he continues to meet the criteria which includes no IV meds or IV fluids, CPK continues to trend down, covid negative within 48 hours, preferably off ICU for 24 hours.  Reviewed the case with [facility] who has the same criteria and no beds today.  Also referred patient to [facility], [facility], [facility].  Case being reviewed and was told they all have similar criteria but will review.  Also called Dr. Louie, patient's private psychiatrist, at [place] and left a message to see if he could assist with a transfer to  inpatient as that is what the family prefers.   also spoke with family to update on situation. 11:38am - Denied by [facility] due to less than 24 hours out of ICU, WBC rising, CPK results not back yet. They will continue to review but doubtful for a weekend admission.  Spoke with Dr. Louie from [place] again who states [facility] is still closed and will not be taking any admissions until sometimes next week.        Working with [facility] on possible transfer of patient. Discussed case with Dr. Pino, who is temporarily working at [facility].  SO administration and Dr. Pino discussed the case and feel the patient would be accepted by Monday if he continues to meet the criteria which includes no IV meds or IV fluids, CPK continues to trend down, covid negative within 48 hours, preferably off ICU for 24 hours.  Reviewed the case with [facility] who has the same criteria and no beds today.  Also referred patient to [facility]ign[facility], [facility], [facility].  Case being reviewed and was told they all have similar criteria but will review.  Also called Dr. Louie, patient's private psychiatrist, at [place] and left a message to see if he could assist with a transfer to  inpatient as that is what the family prefers.   also spoke with family to update on situation.

## 2022-09-23 NOTE — BH CONSULTATION LIAISON PROGRESS NOTE - NSBHFUPINTERVALHXFT_PSY_A_CORE
9/18/22: 21 year old, single, male, disabled, due to Schizophrenia (per mother diagnosed at age 16), history of multiple psychiatric hospitalizations for psychosis, history of suicidal ideations; no history of suicide attempts; history of auditory hallucinations at his baseline, home medications: Risperdal 3.5mg po BID and zoloft 150 mg po daily, followed by psychiatrist Dr Joselito Louie of [place]. Patient was brought in by EMS after mother called 911 due to worsening psychosis and aggression towards the family dog.

## 2022-09-24 DIAGNOSIS — F12.10 CANNABIS ABUSE, UNCOMPLICATED: ICD-10-CM

## 2022-09-24 LAB
ANION GAP SERPL CALC-SCNC: 6 MMOL/L — SIGNIFICANT CHANGE UP (ref 5–17)
BUN SERPL-MCNC: 12 MG/DL — SIGNIFICANT CHANGE UP (ref 7–23)
CALCIUM SERPL-MCNC: 9.9 MG/DL — SIGNIFICANT CHANGE UP (ref 8.5–10.1)
CHLORIDE SERPL-SCNC: 101 MMOL/L — SIGNIFICANT CHANGE UP (ref 96–108)
CK SERPL-CCNC: 834 U/L — HIGH (ref 26–308)
CO2 SERPL-SCNC: 30 MMOL/L — SIGNIFICANT CHANGE UP (ref 22–31)
CREAT SERPL-MCNC: 0.84 MG/DL — SIGNIFICANT CHANGE UP (ref 0.5–1.3)
EGFR: 127 ML/MIN/1.73M2 — SIGNIFICANT CHANGE UP
GLUCOSE SERPL-MCNC: 98 MG/DL — SIGNIFICANT CHANGE UP (ref 70–99)
HCT VFR BLD CALC: 47.6 % — SIGNIFICANT CHANGE UP (ref 39–50)
HGB BLD-MCNC: 16.9 G/DL — SIGNIFICANT CHANGE UP (ref 13–17)
MAGNESIUM SERPL-MCNC: 2 MG/DL — SIGNIFICANT CHANGE UP (ref 1.6–2.6)
MCHC RBC-ENTMCNC: 29.9 PG — SIGNIFICANT CHANGE UP (ref 27–34)
MCHC RBC-ENTMCNC: 35.5 GM/DL — SIGNIFICANT CHANGE UP (ref 32–36)
MCV RBC AUTO: 84.2 FL — SIGNIFICANT CHANGE UP (ref 80–100)
PHOSPHATE SERPL-MCNC: 3.3 MG/DL — SIGNIFICANT CHANGE UP (ref 2.5–4.5)
PLATELET # BLD AUTO: 379 K/UL — SIGNIFICANT CHANGE UP (ref 150–400)
POTASSIUM SERPL-MCNC: 3.7 MMOL/L — SIGNIFICANT CHANGE UP (ref 3.5–5.3)
POTASSIUM SERPL-SCNC: 3.7 MMOL/L — SIGNIFICANT CHANGE UP (ref 3.5–5.3)
RBC # BLD: 5.65 M/UL — SIGNIFICANT CHANGE UP (ref 4.2–5.8)
RBC # FLD: 12.4 % — SIGNIFICANT CHANGE UP (ref 10.3–14.5)
SODIUM SERPL-SCNC: 137 MMOL/L — SIGNIFICANT CHANGE UP (ref 135–145)
VALPROATE SERPL-MCNC: 34 UG/ML — LOW (ref 50–100)
WBC # BLD: 16.98 K/UL — HIGH (ref 3.8–10.5)
WBC # FLD AUTO: 16.98 K/UL — HIGH (ref 3.8–10.5)

## 2022-09-24 PROCEDURE — ZZZZZ: CPT

## 2022-09-24 PROCEDURE — 99233 SBSQ HOSP IP/OBS HIGH 50: CPT

## 2022-09-24 PROCEDURE — 99232 SBSQ HOSP IP/OBS MODERATE 35: CPT

## 2022-09-24 RX ADMIN — Medication 2 MILLIGRAM(S): at 21:10

## 2022-09-24 RX ADMIN — Medication 2 MILLIGRAM(S): at 08:58

## 2022-09-24 RX ADMIN — Medication 50 MILLIGRAM(S): at 21:09

## 2022-09-24 RX ADMIN — DIVALPROEX SODIUM 500 MILLIGRAM(S): 500 TABLET, DELAYED RELEASE ORAL at 09:16

## 2022-09-24 RX ADMIN — DIVALPROEX SODIUM 500 MILLIGRAM(S): 500 TABLET, DELAYED RELEASE ORAL at 21:09

## 2022-09-24 RX ADMIN — HALOPERIDOL DECANOATE 5 MILLIGRAM(S): 100 INJECTION INTRAMUSCULAR at 00:37

## 2022-09-24 RX ADMIN — HALOPERIDOL DECANOATE 5 MILLIGRAM(S): 100 INJECTION INTRAMUSCULAR at 09:16

## 2022-09-24 RX ADMIN — Medication 2 MILLIGRAM(S): at 13:46

## 2022-09-24 RX ADMIN — HALOPERIDOL DECANOATE 5 MILLIGRAM(S): 100 INJECTION INTRAMUSCULAR at 13:46
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
## 2022-09-25 LAB — CK SERPL-CCNC: 267 U/L — SIGNIFICANT CHANGE UP (ref 26–308)

## 2022-09-25 PROCEDURE — 99291 CRITICAL CARE FIRST HOUR: CPT

## 2022-09-25 RX ORDER — DIPHENHYDRAMINE HCL 50 MG
50 CAPSULE ORAL ONCE
Refills: 0 | Status: COMPLETED | OUTPATIENT
Start: 2022-09-25 | End: 2022-09-25

## 2022-09-25 RX ORDER — HALOPERIDOL DECANOATE 100 MG/ML
5 INJECTION INTRAMUSCULAR ONCE
Refills: 0 | Status: COMPLETED | OUTPATIENT
Start: 2022-09-25 | End: 2022-09-25

## 2022-09-25 RX ORDER — DEXMEDETOMIDINE HYDROCHLORIDE IN 0.9% SODIUM CHLORIDE 4 UG/ML
1 INJECTION INTRAVENOUS
Qty: 400 | Refills: 0 | Status: DISCONTINUED | OUTPATIENT
Start: 2022-09-25 | End: 2022-09-27

## 2022-09-25 RX ADMIN — DEXMEDETOMIDINE HYDROCHLORIDE IN 0.9% SODIUM CHLORIDE 19.7 MICROGRAM(S)/KG/HR: 4 INJECTION INTRAVENOUS at 23:47

## 2022-09-25 RX ADMIN — Medication 50 MILLIGRAM(S): at 07:45

## 2022-09-25 RX ADMIN — HALOPERIDOL DECANOATE 5 MILLIGRAM(S): 100 INJECTION INTRAMUSCULAR at 11:47

## 2022-09-25 RX ADMIN — DEXMEDETOMIDINE HYDROCHLORIDE IN 0.9% SODIUM CHLORIDE 19.7 MICROGRAM(S)/KG/HR: 4 INJECTION INTRAVENOUS at 14:06

## 2022-09-25 RX ADMIN — DEXMEDETOMIDINE HYDROCHLORIDE IN 0.9% SODIUM CHLORIDE 19.7 MICROGRAM(S)/KG/HR: 4 INJECTION INTRAVENOUS at 08:38

## 2022-09-25 RX ADMIN — HALOPERIDOL DECANOATE 5 MILLIGRAM(S): 100 INJECTION INTRAMUSCULAR at 06:45

## 2022-09-25 RX ADMIN — DIVALPROEX SODIUM 500 MILLIGRAM(S): 500 TABLET, DELAYED RELEASE ORAL at 21:23

## 2022-09-25 RX ADMIN — HALOPERIDOL DECANOATE 5 MILLIGRAM(S): 100 INJECTION INTRAMUSCULAR at 21:23

## 2022-09-25 RX ADMIN — Medication 2 MILLIGRAM(S): at 06:45

## 2022-09-25 RX ADMIN — Medication 2 MILLIGRAM(S): at 07:45

## 2022-09-25 RX ADMIN — DIVALPROEX SODIUM 500 MILLIGRAM(S): 500 TABLET, DELAYED RELEASE ORAL at 11:48

## 2022-09-25 RX ADMIN — DEXMEDETOMIDINE HYDROCHLORIDE IN 0.9% SODIUM CHLORIDE 19.7 MICROGRAM(S)/KG/HR: 4 INJECTION INTRAVENOUS at 18:05

## 2022-09-25 RX ADMIN — HEPARIN SODIUM 5000 UNIT(S): 5000 INJECTION INTRAVENOUS; SUBCUTANEOUS at 21:24

## 2022-09-25 RX ADMIN — HEPARIN SODIUM 5000 UNIT(S): 5000 INJECTION INTRAVENOUS; SUBCUTANEOUS at 13:24

## 2022-09-25 RX ADMIN — DEXMEDETOMIDINE HYDROCHLORIDE IN 0.9% SODIUM CHLORIDE 19.7 MICROGRAM(S)/KG/HR: 4 INJECTION INTRAVENOUS at 11:21

## 2022-09-25 RX ADMIN — HALOPERIDOL DECANOATE 5 MILLIGRAM(S): 100 INJECTION INTRAMUSCULAR at 07:45
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
## 2022-09-26 PROCEDURE — 99291 CRITICAL CARE FIRST HOUR: CPT

## 2022-09-26 PROCEDURE — 71045 X-RAY EXAM CHEST 1 VIEW: CPT | Mod: 26

## 2022-09-26 RX ORDER — DIPHENHYDRAMINE HCL 50 MG
50 CAPSULE ORAL ONCE
Refills: 0 | Status: COMPLETED | OUTPATIENT
Start: 2022-09-26 | End: 2022-09-26

## 2022-09-26 RX ORDER — DIVALPROEX SODIUM 500 MG/1
750 TABLET, DELAYED RELEASE ORAL
Refills: 0 | Status: DISCONTINUED | OUTPATIENT
Start: 2022-09-26 | End: 2022-09-28

## 2022-09-26 RX ORDER — HALOPERIDOL DECANOATE 100 MG/ML
5 INJECTION INTRAMUSCULAR ONCE
Refills: 0 | Status: COMPLETED | OUTPATIENT
Start: 2022-09-26 | End: 2022-09-26

## 2022-09-26 RX ORDER — HALOPERIDOL DECANOATE 100 MG/ML
5 INJECTION INTRAMUSCULAR THREE TIMES A DAY
Refills: 0 | Status: DISCONTINUED | OUTPATIENT
Start: 2022-09-26 | End: 2022-09-29

## 2022-09-26 RX ADMIN — DIVALPROEX SODIUM 750 MILLIGRAM(S): 500 TABLET, DELAYED RELEASE ORAL at 10:09

## 2022-09-26 RX ADMIN — HALOPERIDOL DECANOATE 5 MILLIGRAM(S): 100 INJECTION INTRAMUSCULAR at 20:39

## 2022-09-26 RX ADMIN — Medication 50 MILLIGRAM(S): at 20:38

## 2022-09-26 RX ADMIN — Medication 2 MILLIGRAM(S): at 22:53

## 2022-09-26 RX ADMIN — Medication 2 MILLIGRAM(S): at 15:02

## 2022-09-26 RX ADMIN — Medication 2 MILLIGRAM(S): at 18:09

## 2022-09-26 RX ADMIN — HEPARIN SODIUM 5000 UNIT(S): 5000 INJECTION INTRAVENOUS; SUBCUTANEOUS at 20:38

## 2022-09-26 RX ADMIN — Medication 2 MILLIGRAM(S): at 10:09

## 2022-09-26 RX ADMIN — HEPARIN SODIUM 5000 UNIT(S): 5000 INJECTION INTRAVENOUS; SUBCUTANEOUS at 15:02

## 2022-09-26 RX ADMIN — HALOPERIDOL DECANOATE 5 MILLIGRAM(S): 100 INJECTION INTRAMUSCULAR at 18:10

## 2022-09-26 RX ADMIN — DEXMEDETOMIDINE HYDROCHLORIDE IN 0.9% SODIUM CHLORIDE 19.7 MICROGRAM(S)/KG/HR: 4 INJECTION INTRAVENOUS at 08:36

## 2022-09-26 RX ADMIN — HALOPERIDOL DECANOATE 5 MILLIGRAM(S): 100 INJECTION INTRAMUSCULAR at 15:02

## 2022-09-26 RX ADMIN — Medication 4 MILLIGRAM(S): at 01:08

## 2022-09-26 RX ADMIN — HALOPERIDOL DECANOATE 5 MILLIGRAM(S): 100 INJECTION INTRAMUSCULAR at 00:20

## 2022-09-26 RX ADMIN — DEXMEDETOMIDINE HYDROCHLORIDE IN 0.9% SODIUM CHLORIDE 19.7 MICROGRAM(S)/KG/HR: 4 INJECTION INTRAVENOUS at 04:09

## 2022-09-26 RX ADMIN — HALOPERIDOL DECANOATE 5 MILLIGRAM(S): 100 INJECTION INTRAMUSCULAR at 10:10

## 2022-09-26 RX ADMIN — Medication 50 MILLIGRAM(S): at 00:20

## 2022-09-26 RX ADMIN — DIVALPROEX SODIUM 750 MILLIGRAM(S): 500 TABLET, DELAYED RELEASE ORAL at 20:40
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
## 2022-09-26 NOTE — PROVIDER CONTACT NOTE (CHANGE IN STATUS NOTIFICATION) - ACTION/TREATMENT ORDERED:
Ativan 4mg IM, haldol 5mg IM, benadryl 50 IM. precedex increased as per policy
security called to assist in replacing monitor and administering medications. RN supervisor Marni Nuñez present at bedside as well.

## 2022-09-26 NOTE — PROGRESS NOTE ADULT - SUBJECTIVE AND OBJECTIVE BOX
Patient is a 21y old  Male who presents with a chief complaint of AMS (25 Sep 2022 10:36)    24 hour events:     PAST MEDICAL & SURGICAL HISTORY:  Schizophrenia          Allergies    No Known Allergies    Intolerances      REVIEW OF SYSTEMS: SEE BELOW       ICU Vital Signs Last 24 Hrs  T(C): 36.6 (25 Sep 2022 20:00), Max: 36.6 (25 Sep 2022 12:00)  T(F): 97.9 (25 Sep 2022 20:00), Max: 97.9 (25 Sep 2022 20:00)  HR: 54 (26 Sep 2022 07:00) (37 - 87)  BP: 109/69 (26 Sep 2022 07:00) (93/58 - 138/76)  BP(mean): 78 (26 Sep 2022 07:00) (66 - 103)  ABP: --  ABP(mean): --  RR: 16 (26 Sep 2022 07:00) (14 - 28)  SpO2: 100% (26 Sep 2022 04:00) (96% - 100%)    O2 Parameters below as of 25 Sep 2022 18:00  Patient On (Oxygen Delivery Method): room air            CAPILLARY BLOOD GLUCOSE          I&O's Summary    25 Sep 2022 07:01  -  26 Sep 2022 07:00  --------------------------------------------------------  IN: 1359 mL / OUT: 600 mL / NET: 759 mL            MEDICATIONS  (STANDING):  dexMEDEtomidine Infusion 1 MICROgram(s)/kG/Hr (19.7 mL/Hr) IV Continuous <Continuous>  diVALproex  milliGRAM(s) Oral two times a day  haloperidol     Tablet 5 milliGRAM(s) Oral two times a day  heparin   Injectable 5000 Unit(s) SubCutaneous every 8 hours  LORazepam   Injectable 2 milliGRAM(s) IntraMuscular daily      MEDICATIONS  (PRN):  diphenhydrAMINE 50 milliGRAM(s) Oral every 6 hours PRN EPS  diphenhydrAMINE Injectable 50 milliGRAM(s) IntraMuscular once PRN Extrapyramidal prophylaxis  haloperidol     Tablet 5 milliGRAM(s) Oral every 6 hours PRN MODERATE PSYCHOTIC AGITATION  LORazepam   Injectable 2 milliGRAM(s) IV Push every 4 hours PRN Agitation      PHYSICAL EXAM: SEE BELOW                CARDIAC MARKERS ( 25 Sep 2022 05:27 )  x     / x     / 267 U/L / x     / x                       Patient is a 21y old  Male who presents with a chief complaint of AMS (25 Sep 2022 10:36)    24 hour events: continues to have intermittent severe agitation   on precedex and leather restraints     PAST MEDICAL & SURGICAL HISTORY:  Schizophrenia          Allergies    No Known Allergies    Intolerances      REVIEW OF SYSTEMS: SEE BELOW       ICU Vital Signs Last 24 Hrs  T(C): 36.6 (25 Sep 2022 20:00), Max: 36.6 (25 Sep 2022 12:00)  T(F): 97.9 (25 Sep 2022 20:00), Max: 97.9 (25 Sep 2022 20:00)  HR: 54 (26 Sep 2022 07:00) (37 - 87)  BP: 109/69 (26 Sep 2022 07:00) (93/58 - 138/76)  BP(mean): 78 (26 Sep 2022 07:00) (66 - 103)  ABP: --  ABP(mean): --  RR: 16 (26 Sep 2022 07:00) (14 - 28)  SpO2: 100% (26 Sep 2022 04:00) (96% - 100%)    O2 Parameters below as of 25 Sep 2022 18:00  Patient On (Oxygen Delivery Method): room air            CAPILLARY BLOOD GLUCOSE          I&O's Summary    25 Sep 2022 07:01  -  26 Sep 2022 07:00  --------------------------------------------------------  IN: 1359 mL / OUT: 600 mL / NET: 759 mL            MEDICATIONS  (STANDING):  dexMEDEtomidine Infusion 1 MICROgram(s)/kG/Hr (19.7 mL/Hr) IV Continuous <Continuous>  diVALproex  milliGRAM(s) Oral two times a day  haloperidol     Tablet 5 milliGRAM(s) Oral two times a day  heparin   Injectable 5000 Unit(s) SubCutaneous every 8 hours  LORazepam   Injectable 2 milliGRAM(s) IntraMuscular daily      MEDICATIONS  (PRN):  diphenhydrAMINE 50 milliGRAM(s) Oral every 6 hours PRN EPS  diphenhydrAMINE Injectable 50 milliGRAM(s) IntraMuscular once PRN Extrapyramidal prophylaxis  haloperidol     Tablet 5 milliGRAM(s) Oral every 6 hours PRN MODERATE PSYCHOTIC AGITATION  LORazepam   Injectable 2 milliGRAM(s) IV Push every 4 hours PRN Agitation      PHYSICAL EXAM: SEE BELOW                CARDIAC MARKERS ( 25 Sep 2022 05:27 )  x     / x     / 267 U/L / x     / x

## 2022-09-27 LAB — SARS-COV-2 RNA SPEC QL NAA+PROBE: SIGNIFICANT CHANGE UP

## 2022-09-27 PROCEDURE — 99291 CRITICAL CARE FIRST HOUR: CPT

## 2022-09-27 PROCEDURE — 99292 CRITICAL CARE ADDL 30 MIN: CPT

## 2022-09-27 PROCEDURE — 99232 SBSQ HOSP IP/OBS MODERATE 35: CPT

## 2022-09-27 RX ORDER — DIPHENHYDRAMINE HCL 50 MG
50 CAPSULE ORAL ONCE
Refills: 0 | Status: DISCONTINUED | OUTPATIENT
Start: 2022-09-27 | End: 2022-09-27

## 2022-09-27 RX ORDER — OLANZAPINE 15 MG/1
5 TABLET, FILM COATED ORAL EVERY 6 HOURS
Refills: 0 | Status: DISCONTINUED | OUTPATIENT
Start: 2022-09-27 | End: 2022-09-29

## 2022-09-27 RX ORDER — CHLORPROMAZINE HCL 10 MG
100 TABLET ORAL EVERY 6 HOURS
Refills: 0 | Status: DISCONTINUED | OUTPATIENT
Start: 2022-09-27 | End: 2022-09-27

## 2022-09-27 RX ORDER — OLANZAPINE 15 MG/1
5 TABLET, FILM COATED ORAL AT BEDTIME
Refills: 0 | Status: DISCONTINUED | OUTPATIENT
Start: 2022-09-27 | End: 2022-09-29

## 2022-09-27 RX ORDER — HEPARIN SODIUM 5000 [USP'U]/ML
5000 INJECTION INTRAVENOUS; SUBCUTANEOUS EVERY 8 HOURS
Refills: 0 | Status: DISCONTINUED | OUTPATIENT
Start: 2022-09-27 | End: 2022-09-29

## 2022-09-27 RX ADMIN — DIVALPROEX SODIUM 750 MILLIGRAM(S): 500 TABLET, DELAYED RELEASE ORAL at 09:07

## 2022-09-27 RX ADMIN — Medication 100 MILLIGRAM(S): at 11:12

## 2022-09-27 RX ADMIN — HALOPERIDOL DECANOATE 5 MILLIGRAM(S): 100 INJECTION INTRAMUSCULAR at 00:56

## 2022-09-27 RX ADMIN — Medication 2 MILLIGRAM(S): at 01:09

## 2022-09-27 RX ADMIN — Medication 2 MILLIGRAM(S): at 18:29

## 2022-09-27 RX ADMIN — Medication 2 MILLIGRAM(S): at 21:35

## 2022-09-27 RX ADMIN — Medication 2 MILLIGRAM(S): at 16:41

## 2022-09-27 RX ADMIN — Medication 2 MILLIGRAM(S): at 12:25

## 2022-09-27 RX ADMIN — OLANZAPINE 5 MILLIGRAM(S): 15 TABLET, FILM COATED ORAL at 23:59

## 2022-09-27 RX ADMIN — HALOPERIDOL DECANOATE 5 MILLIGRAM(S): 100 INJECTION INTRAMUSCULAR at 04:55

## 2022-09-27 RX ADMIN — HALOPERIDOL DECANOATE 5 MILLIGRAM(S): 100 INJECTION INTRAMUSCULAR at 14:26

## 2022-09-27 RX ADMIN — OLANZAPINE 5 MILLIGRAM(S): 15 TABLET, FILM COATED ORAL at 12:40

## 2022-09-27 RX ADMIN — Medication 2 MILLIGRAM(S): at 04:55

## 2022-09-27 RX ADMIN — HALOPERIDOL DECANOATE 5 MILLIGRAM(S): 100 INJECTION INTRAMUSCULAR at 09:07

## 2022-09-27 RX ADMIN — Medication 2 MILLIGRAM(S): at 14:26

## 2022-09-27 NOTE — PROGRESS NOTE ADULT - COMMENTS
Can not Obtain
Not Reliable
Not Reliable
UTO
Not Reliable
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
## 2022-09-27 NOTE — PROGRESS NOTE ADULT - ADDITIONAL PE
multiple episodes of severe agitation, combativeness, verbal abuse toward staff, no focal deficit, no seizure like episodes
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
## 2022-09-27 NOTE — PROGRESS NOTE ADULT - SUBJECTIVE AND OBJECTIVE BOX
Patient is a 21y old  Male who presents with a chief complaint of AMS (26 Sep 2022 07:41)    24 hour events: he was calm early in am but subsequently had severe agitation episode today requiring code gray, leather restraints, multiple meds     PAST MEDICAL & SURGICAL HISTORY:  Schizophrenia          Allergies    No Known Allergies    Intolerances      REVIEW OF SYSTEMS: SEE BELOW       ICU Vital Signs Last 24 Hrs  T(C): --  T(F): --  HR: 131 (27 Sep 2022 18:00) (79 - 160)  BP: 117/72 (27 Sep 2022 18:00) (117/72 - 142/79)  BP(mean): 80 (27 Sep 2022 18:00) (80 - 99)  ABP: --  ABP(mean): --  RR: 22 (27 Sep 2022 18:00) (13 - 28)  SpO2: 96% (27 Sep 2022 18:00) (94% - 97%)    O2 Parameters below as of 27 Sep 2022 18:00  Patient On (Oxygen Delivery Method): room air            CAPILLARY BLOOD GLUCOSE          I&O's Summary    26 Sep 2022 07:01  -  27 Sep 2022 07:00  --------------------------------------------------------  IN: 951 mL / OUT: 1900 mL / NET: -949 mL    27 Sep 2022 07:01  -  27 Sep 2022 18:53  --------------------------------------------------------  IN: 660 mL / OUT: 1050 mL / NET: -390 mL            MEDICATIONS  (STANDING):  diVALproex  milliGRAM(s) Oral two times a day  haloperidol     Tablet 5 milliGRAM(s) Oral three times a day  heparin   Injectable 5000 Unit(s) SubCutaneous every 8 hours  LORazepam     Tablet 2 milliGRAM(s) Oral every 4 hours  OLANZapine 5 milliGRAM(s) Oral at bedtime      MEDICATIONS  (PRN):  diphenhydrAMINE Injectable 50 milliGRAM(s) IntraMuscular once PRN Extrapyramidal prophylaxis  LORazepam   Injectable 2 milliGRAM(s) IntraMuscular every 4 hours PRN severe agitation  OLANZapine 5 milliGRAM(s) Oral every 6 hours PRN moderate psychotic  agitation  OLANZapine Injectable 5 milliGRAM(s) IntraMuscular every 6 hours PRN severe psychotc agitation      PHYSICAL EXAM: SEE BELOW

## 2022-09-28 LAB
ADD ON TEST-SPECIMEN IN LAB: SIGNIFICANT CHANGE UP
ANION GAP SERPL CALC-SCNC: 8 MMOL/L — SIGNIFICANT CHANGE UP (ref 5–17)
BUN SERPL-MCNC: 9 MG/DL — SIGNIFICANT CHANGE UP (ref 7–23)
CALCIUM SERPL-MCNC: 9.3 MG/DL — SIGNIFICANT CHANGE UP (ref 8.5–10.1)
CHLORIDE SERPL-SCNC: 106 MMOL/L — SIGNIFICANT CHANGE UP (ref 96–108)
CK SERPL-CCNC: 2255 U/L — HIGH (ref 26–308)
CO2 SERPL-SCNC: 26 MMOL/L — SIGNIFICANT CHANGE UP (ref 22–31)
CREAT SERPL-MCNC: 0.84 MG/DL — SIGNIFICANT CHANGE UP (ref 0.5–1.3)
EGFR: 127 ML/MIN/1.73M2 — SIGNIFICANT CHANGE UP
GLUCOSE SERPL-MCNC: 96 MG/DL — SIGNIFICANT CHANGE UP (ref 70–99)
HCT VFR BLD CALC: 46.6 % — SIGNIFICANT CHANGE UP (ref 39–50)
HGB BLD-MCNC: 15.8 G/DL — SIGNIFICANT CHANGE UP (ref 13–17)
MAGNESIUM SERPL-MCNC: 2.3 MG/DL — SIGNIFICANT CHANGE UP (ref 1.6–2.6)
MCHC RBC-ENTMCNC: 29 PG — SIGNIFICANT CHANGE UP (ref 27–34)
MCHC RBC-ENTMCNC: 33.9 GM/DL — SIGNIFICANT CHANGE UP (ref 32–36)
MCV RBC AUTO: 85.7 FL — SIGNIFICANT CHANGE UP (ref 80–100)
PHOSPHATE SERPL-MCNC: 3.6 MG/DL — SIGNIFICANT CHANGE UP (ref 2.5–4.5)
PLATELET # BLD AUTO: 415 K/UL — HIGH (ref 150–400)
POTASSIUM SERPL-MCNC: 3.2 MMOL/L — LOW (ref 3.5–5.3)
POTASSIUM SERPL-SCNC: 3.2 MMOL/L — LOW (ref 3.5–5.3)
RBC # BLD: 5.44 M/UL — SIGNIFICANT CHANGE UP (ref 4.2–5.8)
RBC # FLD: 12.7 % — SIGNIFICANT CHANGE UP (ref 10.3–14.5)
SODIUM SERPL-SCNC: 140 MMOL/L — SIGNIFICANT CHANGE UP (ref 135–145)
VALPROATE SERPL-MCNC: 56 UG/ML — SIGNIFICANT CHANGE UP (ref 50–100)
WBC # BLD: 14.45 K/UL — HIGH (ref 3.8–10.5)
WBC # FLD AUTO: 14.45 K/UL — HIGH (ref 3.8–10.5)

## 2022-09-28 PROCEDURE — 99233 SBSQ HOSP IP/OBS HIGH 50: CPT

## 2022-09-28 RX ORDER — POTASSIUM CHLORIDE 20 MEQ
40 PACKET (EA) ORAL ONCE
Refills: 0 | Status: COMPLETED | OUTPATIENT
Start: 2022-09-28 | End: 2022-09-28

## 2022-09-28 RX ORDER — DIVALPROEX SODIUM 500 MG/1
1000 TABLET, DELAYED RELEASE ORAL
Refills: 0 | Status: DISCONTINUED | OUTPATIENT
Start: 2022-09-28 | End: 2022-09-29

## 2022-09-28 RX ADMIN — HEPARIN SODIUM 5000 UNIT(S): 5000 INJECTION INTRAVENOUS; SUBCUTANEOUS at 16:08

## 2022-09-28 RX ADMIN — Medication 40 MILLIEQUIVALENT(S): at 13:03

## 2022-09-28 RX ADMIN — Medication 2 MILLIGRAM(S): at 17:47

## 2022-09-28 RX ADMIN — OLANZAPINE 5 MILLIGRAM(S): 15 TABLET, FILM COATED ORAL at 07:12

## 2022-09-28 RX ADMIN — DIVALPROEX SODIUM 1000 MILLIGRAM(S): 500 TABLET, DELAYED RELEASE ORAL at 13:04

## 2022-09-28 RX ADMIN — OLANZAPINE 5 MILLIGRAM(S): 15 TABLET, FILM COATED ORAL at 21:10

## 2022-09-28 RX ADMIN — Medication 2 MILLIGRAM(S): at 21:09

## 2022-09-28 RX ADMIN — HALOPERIDOL DECANOATE 5 MILLIGRAM(S): 100 INJECTION INTRAMUSCULAR at 21:12

## 2022-09-28 RX ADMIN — Medication 2 MILLIGRAM(S): at 02:46

## 2022-09-28 RX ADMIN — Medication 2 MILLIGRAM(S): at 13:15

## 2022-09-28 RX ADMIN — OLANZAPINE 5 MILLIGRAM(S): 15 TABLET, FILM COATED ORAL at 22:06

## 2022-09-28 RX ADMIN — Medication 2 MILLIGRAM(S): at 16:30

## 2022-09-28 RX ADMIN — HALOPERIDOL DECANOATE 5 MILLIGRAM(S): 100 INJECTION INTRAMUSCULAR at 16:29

## 2022-09-28 RX ADMIN — HEPARIN SODIUM 5000 UNIT(S): 5000 INJECTION INTRAVENOUS; SUBCUTANEOUS at 21:09

## 2022-09-28 RX ADMIN — Medication 2 MILLIGRAM(S): at 19:30

## 2022-09-28 RX ADMIN — Medication 2 MILLIGRAM(S): at 08:40

## 2022-09-28 NOTE — PROGRESS NOTE ADULT - RESPIRATORY
clear to auscultation bilaterally/no wheezes/no rales/no rhonchi
clear to auscultation bilaterally/no wheezes/no rales/no rhonchi
clear to auscultation bilaterally/no wheezes/no respiratory distress/breath sounds equal
clear to auscultation bilaterally/no wheezes/no rales/no rhonchi
clear to auscultation bilaterally/no wheezes/no respiratory distress/breath sounds equal/good air movement
clear to auscultation bilaterally/no respiratory distress/breath sounds equal/good air movement
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
## 2022-09-28 NOTE — PROVIDER CONTACT NOTE (OTHER) - ASSESSMENT
The team met with patient to talk about the treatment plan. Patient will agree to accept his PO meds. 30 minutes after accepting PO meds, one arm and one leg will be released from restraint. The remaining arm and legs will be released if patient is able to maintain self control.

## 2022-09-28 NOTE — PROGRESS NOTE ADULT - PROVIDER SPECIALTY LIST ADULT
Critical Care

## 2022-09-28 NOTE — PROGRESS NOTE ADULT - SUBJECTIVE AND OBJECTIVE BOX
Patient is a 21y old  Male who presents with a chief complaint of AMS (27 Sep 2022 18:50)    24 hour events:     PAST MEDICAL & SURGICAL HISTORY:  Schizophrenia          Allergies    No Known Allergies    Intolerances      REVIEW OF SYSTEMS: SEE BELOW       ICU Vital Signs Last 24 Hrs  T(C): --  T(F): --  HR: 104 (28 Sep 2022 17:55) (98 - 126)  BP: 132/83 (28 Sep 2022 17:55) (120/75 - 134/92)  BP(mean): 92 (28 Sep 2022 17:55) (85 - 104)  ABP: --  ABP(mean): --  RR: 20 (28 Sep 2022 17:55) (20 - 23)  SpO2: 97% (28 Sep 2022 17:55) (93% - 99%)    O2 Parameters below as of 28 Sep 2022 17:55  Patient On (Oxygen Delivery Method): room air            CAPILLARY BLOOD GLUCOSE          I&O's Summary    27 Sep 2022 07:01  -  28 Sep 2022 07:00  --------------------------------------------------------  IN: 660 mL / OUT: 1050 mL / NET: -390 mL    28 Sep 2022 07:01  -  28 Sep 2022 18:21  --------------------------------------------------------  IN: 0 mL / OUT: 250 mL / NET: -250 mL            MEDICATIONS  (STANDING):  diVALproex DR 1000 milliGRAM(s) Oral two times a day  haloperidol     Tablet 5 milliGRAM(s) Oral three times a day  heparin   Injectable 5000 Unit(s) SubCutaneous every 8 hours  LORazepam     Tablet 2 milliGRAM(s) Oral every 4 hours  OLANZapine 5 milliGRAM(s) Oral at bedtime      MEDICATIONS  (PRN):  diphenhydrAMINE Injectable 50 milliGRAM(s) IntraMuscular once PRN Extrapyramidal prophylaxis  LORazepam   Injectable 2 milliGRAM(s) IntraMuscular every 4 hours PRN severe agitation  OLANZapine 5 milliGRAM(s) Oral every 6 hours PRN moderate psychotic  agitation  OLANZapine Injectable 5 milliGRAM(s) IntraMuscular every 6 hours PRN severe psychotc agitation      PHYSICAL EXAM: SEE BELOW                          15.8   14.45 )-----------( 415      ( 28 Sep 2022 07:37 )             46.6       09-28    140  |  106  |  9   ----------------------------<  96  3.2<L>   |  26  |  0.84    Ca    9.3      28 Sep 2022 07:37  Phos  3.6     09-28  Mg     2.3     09-28        CARDIAC MARKERS ( 28 Sep 2022 07:37 )  x     / x     / 2255 U/L / x     / x                       Patient is a 21y old  Male who presents with a chief complaint of AMS (27 Sep 2022 18:50)    24 hour events: calmer but continues to have periods of mild agitation  on 4 point restraints    PAST MEDICAL & SURGICAL HISTORY:  Schizophrenia          Allergies    No Known Allergies    Intolerances      REVIEW OF SYSTEMS: SEE BELOW       ICU Vital Signs Last 24 Hrs  T(C): --  T(F): --  HR: 104 (28 Sep 2022 17:55) (98 - 126)  BP: 132/83 (28 Sep 2022 17:55) (120/75 - 134/92)  BP(mean): 92 (28 Sep 2022 17:55) (85 - 104)  ABP: --  ABP(mean): --  RR: 20 (28 Sep 2022 17:55) (20 - 23)  SpO2: 97% (28 Sep 2022 17:55) (93% - 99%)    O2 Parameters below as of 28 Sep 2022 17:55  Patient On (Oxygen Delivery Method): room air            CAPILLARY BLOOD GLUCOSE          I&O's Summary    27 Sep 2022 07:01  -  28 Sep 2022 07:00  --------------------------------------------------------  IN: 660 mL / OUT: 1050 mL / NET: -390 mL    28 Sep 2022 07:01  -  28 Sep 2022 18:21  --------------------------------------------------------  IN: 0 mL / OUT: 250 mL / NET: -250 mL            MEDICATIONS  (STANDING):  diVALproex DR 1000 milliGRAM(s) Oral two times a day  haloperidol     Tablet 5 milliGRAM(s) Oral three times a day  heparin   Injectable 5000 Unit(s) SubCutaneous every 8 hours  LORazepam     Tablet 2 milliGRAM(s) Oral every 4 hours  OLANZapine 5 milliGRAM(s) Oral at bedtime      MEDICATIONS  (PRN):  diphenhydrAMINE Injectable 50 milliGRAM(s) IntraMuscular once PRN Extrapyramidal prophylaxis  LORazepam   Injectable 2 milliGRAM(s) IntraMuscular every 4 hours PRN severe agitation  OLANZapine 5 milliGRAM(s) Oral every 6 hours PRN moderate psychotic  agitation  OLANZapine Injectable 5 milliGRAM(s) IntraMuscular every 6 hours PRN severe psychotc agitation      PHYSICAL EXAM: SEE BELOW                          15.8   14.45 )-----------( 415      ( 28 Sep 2022 07:37 )             46.6       09-28    140  |  106  |  9   ----------------------------<  96  3.2<L>   |  26  |  0.84    Ca    9.3      28 Sep 2022 07:37  Phos  3.6     09-28  Mg     2.3     09-28        CARDIAC MARKERS ( 28 Sep 2022 07:37 )  x     / x     / 2255 U/L / x     / x
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
## 2022-09-28 NOTE — BH CONSULTATION LIAISON PROGRESS NOTE - NSBHATTESTAPPBILLTIME_PSY_A_CORE
I attest my time as MIRANDA is greater than 50% of the total combined time spent on qualifying patient care activities. I have reviewed and verified the documentation.
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
## 2022-09-29 ENCOUNTER — TRANSCRIPTION ENCOUNTER (OUTPATIENT)
Age: 22
End: 2022-09-29

## 2022-09-29 VITALS
SYSTOLIC BLOOD PRESSURE: 138 MMHG | TEMPERATURE: 98 F | DIASTOLIC BLOOD PRESSURE: 89 MMHG | HEART RATE: 101 BPM | RESPIRATION RATE: 16 BRPM | OXYGEN SATURATION: 99 %

## 2022-09-29 LAB
ADD ON TEST-SPECIMEN IN LAB: SIGNIFICANT CHANGE UP
ADD ON TEST-SPECIMEN IN LAB: SIGNIFICANT CHANGE UP
ANION GAP SERPL CALC-SCNC: 5 MMOL/L — SIGNIFICANT CHANGE UP (ref 5–17)
BUN SERPL-MCNC: 10 MG/DL — SIGNIFICANT CHANGE UP (ref 7–23)
CALCIUM SERPL-MCNC: 9.3 MG/DL — SIGNIFICANT CHANGE UP (ref 8.5–10.1)
CHLORIDE SERPL-SCNC: 108 MMOL/L — SIGNIFICANT CHANGE UP (ref 96–108)
CK SERPL-CCNC: 1672 U/L — HIGH (ref 26–308)
CO2 SERPL-SCNC: 29 MMOL/L — SIGNIFICANT CHANGE UP (ref 22–31)
CREAT SERPL-MCNC: 0.82 MG/DL — SIGNIFICANT CHANGE UP (ref 0.5–1.3)
EGFR: 128 ML/MIN/1.73M2 — SIGNIFICANT CHANGE UP
GLUCOSE SERPL-MCNC: 144 MG/DL — HIGH (ref 70–99)
MAGNESIUM SERPL-MCNC: 2.3 MG/DL — SIGNIFICANT CHANGE UP (ref 1.6–2.6)
POTASSIUM SERPL-MCNC: 3.7 MMOL/L — SIGNIFICANT CHANGE UP (ref 3.5–5.3)
POTASSIUM SERPL-SCNC: 3.7 MMOL/L — SIGNIFICANT CHANGE UP (ref 3.5–5.3)
SODIUM SERPL-SCNC: 142 MMOL/L — SIGNIFICANT CHANGE UP (ref 135–145)
TROPONIN I, HIGH SENSITIVITY RESULT: 5.31 NG/L — SIGNIFICANT CHANGE UP

## 2022-09-29 PROCEDURE — 99239 HOSP IP/OBS DSCHRG MGMT >30: CPT

## 2022-09-29 PROCEDURE — 99231 SBSQ HOSP IP/OBS SF/LOW 25: CPT

## 2022-09-29 PROCEDURE — 93010 ELECTROCARDIOGRAM REPORT: CPT

## 2022-09-29 RX ORDER — HALOPERIDOL DECANOATE 100 MG/ML
1 INJECTION INTRAMUSCULAR
Qty: 0 | Refills: 0 | DISCHARGE
Start: 2022-09-29

## 2022-09-29 RX ORDER — RISPERIDONE 4 MG/1
1 TABLET ORAL
Qty: 0 | Refills: 0 | DISCHARGE

## 2022-09-29 RX ORDER — OLANZAPINE 15 MG/1
1 TABLET, FILM COATED ORAL
Qty: 0 | Refills: 0 | DISCHARGE
Start: 2022-09-29

## 2022-09-29 RX ORDER — HYDROXYZINE HCL 10 MG
2 TABLET ORAL
Qty: 0 | Refills: 0 | DISCHARGE

## 2022-09-29 RX ORDER — DIVALPROEX SODIUM 500 MG/1
2 TABLET, DELAYED RELEASE ORAL
Qty: 0 | Refills: 0 | DISCHARGE
Start: 2022-09-29

## 2022-09-29 RX ORDER — HALOPERIDOL DECANOATE 100 MG/ML
2 INJECTION INTRAMUSCULAR ONCE
Refills: 0 | Status: COMPLETED | OUTPATIENT
Start: 2022-09-29 | End: 2022-09-29

## 2022-09-29 RX ORDER — POTASSIUM CHLORIDE 20 MEQ
40 PACKET (EA) ORAL ONCE
Refills: 0 | Status: COMPLETED | OUTPATIENT
Start: 2022-09-29 | End: 2022-09-29

## 2022-09-29 RX ADMIN — HALOPERIDOL DECANOATE 2 MILLIGRAM(S): 100 INJECTION INTRAMUSCULAR at 15:32

## 2022-09-29 RX ADMIN — HALOPERIDOL DECANOATE 5 MILLIGRAM(S): 100 INJECTION INTRAMUSCULAR at 13:35

## 2022-09-29 RX ADMIN — Medication 2 MILLIGRAM(S): at 13:36

## 2022-09-29 RX ADMIN — Medication 2 MILLIGRAM(S): at 09:44

## 2022-09-29 RX ADMIN — DIVALPROEX SODIUM 1000 MILLIGRAM(S): 500 TABLET, DELAYED RELEASE ORAL at 09:16

## 2022-09-29 RX ADMIN — OLANZAPINE 5 MILLIGRAM(S): 15 TABLET, FILM COATED ORAL at 10:50

## 2022-09-29 RX ADMIN — HEPARIN SODIUM 5000 UNIT(S): 5000 INJECTION INTRAVENOUS; SUBCUTANEOUS at 13:39

## 2022-09-29 RX ADMIN — Medication 2 MILLIGRAM(S): at 09:16

## 2022-09-29 RX ADMIN — Medication 40 MILLIEQUIVALENT(S): at 14:20

## 2022-09-29 RX ADMIN — OLANZAPINE 5 MILLIGRAM(S): 15 TABLET, FILM COATED ORAL at 04:32

## 2022-09-29 RX ADMIN — Medication 2 MILLIGRAM(S): at 01:27

## 2022-09-29 RX ADMIN — Medication 2 MILLIGRAM(S): at 00:30

## 2022-09-29 RX ADMIN — Medication 2 MILLIGRAM(S): at 15:33
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
## 2022-09-29 NOTE — BH CONSULTATION LIAISON PROGRESS NOTE - NSBHMSEMOOD_PSY_A_CORE
Irritable/Angry
Irritable/Angry
Normal
Unable to assess
Unable to assess
Irritable/Angry
Normal
Unable to assess
Depressed/Anxious
Depressed/Anxious

## 2022-09-29 NOTE — BH CONSULTATION LIAISON PROGRESS NOTE - LEVEL OF CONSCIOUSNESS
Alert
Alert
Lethargic, arousable to verbal stimulus
Lethargic, arousable to tactile stimulus
Lethargic, arousable to painful stimulus
Alert
Lethargic, arousable to tactile stimulus
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
## 2022-09-29 NOTE — BH CONSULTATION LIAISON PROGRESS NOTE - NSBHATTESTBILLINGAW_PSY_A_CORE
[id number] Inpatient care - moderate complexity - 25 minutes
[id number]-Eukhklhhuc Inpatient care - moderate complexity - 25 minutes
[id number]-Ifgqhprrsb Inpatient care - moderate complexity - 25 minutes
Billing in another system
[id number]-Uzyjfkykcy Inpatient care - moderate complexity - 25 minutes
99222-Initial hospital care - moderate complexity
[id number]-Lceumrneqs Inpatient care - moderate complexity - 25 minutes
[id number]-Euiairhkld Inpatient care - low complexity - 15 minutes
29636-Dlxuezkmxn Inpatient care - moderate complexity - 25 minutes
13344-Yejgpkhrkt Inpatient care - moderate complexity - 25 minutes

## 2022-09-29 NOTE — BH CONSULTATION LIAISON PROGRESS NOTE - NSBHFUPREASONCONS_PSY_A_CORE
agitation/psychosis
agitation/psychosis
psychosis
agitation/psychosis

## 2022-09-29 NOTE — BH CONSULTATION LIAISON PROGRESS NOTE - NSBHCONSULTMEDPRNREASON_PSY_A_CORE
agitation.../severe agitation...
agitation.../severe agitation...
severe agitation...
agitation.../severe agitation...

## 2022-09-29 NOTE — BH CONSULTATION LIAISON PROGRESS NOTE - NSBHCONSULTMEDSEVERE_PSY_A_CORE FT
haloperidol    Injectable 5 milliGRAM(s) IV Push every 6 hours PRN SEVERE PSYCHOTIC AGITATION  LORazepam   Injectable 2 milliGRAM(s) IV Push every 4 hours PRN severe agitation  diphenhydrAMINE Injectable 50 milliGRAM(s) IV Push every 6 hours PRN EPS  
see above 
haloperidol    Injectable 5 milliGRAM(s) IV Push every 6 hours PRN SEVERE PSYCHOTIC AGITATION  LORazepam   Injectable 2 milliGRAM(s) IV Push every 4 hours PRN severe agitation  diphenhydrAMINE Injectable 50 milliGRAM(s) IV Push every 6 hours PRN EPS  
haloperidol    Injectable 5 milliGRAM(s) IV Push every 6 hours PRN SEVERE PSYCHOTIC AGITATION  LORazepam   Injectable 2 milliGRAM(s) IV Push every 4 hours PRN severe agitation  diphenhydrAMINE Injectable 50 milliGRAM(s) IV Push every 6 hours PRN EPS  
haldol 5mg IM prn for severe aggression related to psychosis  ativan 2mg IM for severe agitation related to psychosis  benadryl 50IM for severe anxiety related to psychosis
haloperidol    Injectable 5 milliGRAM(s) IntraMuscular every 6 hours PRN severe psychotic agitation  LORazepam   Injectable 2 milliGRAM(s) IntraMuscular every 4 hours PRN SEVERE AGITATION  
haloperidol    Injectable 5 milliGRAM(s) IV Push every 6 hours PRN SEVERE PSYCHOTIC AGITATION  LORazepam   Injectable 2 milliGRAM(s) IV Push every 4 hours PRN severe agitation  diphenhydrAMINE Injectable 50 milliGRAM(s) IV Push every 6 hours PRN EPS

## 2022-09-29 NOTE — BH CONSULTATION LIAISON PROGRESS NOTE - NSBHMSEAFFQUAL_PSY_A_CORE
Depressed/Anxious
Unable to assess
Irritable
Anxious
Depressed/Anxious

## 2022-09-29 NOTE — DISCHARGE NOTE PROVIDER - NSDCMRMEDTOKEN_GEN_ALL_CORE_FT
Ativan 2 mg oral tablet: 1 tab(s) orally every 4 hours  divalproex sodium 500 mg oral delayed release tablet: 2 tab(s) orally 2 times a day  haloperidol 5 mg oral tablet: 1 tab(s) orally 3 times a day  OLANZapine 5 mg oral tablet: 1 tab(s) orally once a day (at bedtime)

## 2022-09-29 NOTE — BH CONSULTATION LIAISON PROGRESS NOTE - NSBHCHARTREVIEWINVESTIGATE_PSY_A_CORE FT
Ventricular Rate 80 BPM    Atrial Rate 80 BPM    P-R Interval 178 ms    QRS Duration 88 ms    Q-T Interval 392 ms    QTC Calculation(Bazett) 452 ms    P Axis 79 degrees    R Axis 87 degrees    T Axis 60 degrees    Diagnosis Line Normal sinus rhythm  ST elevation, consider early repolarization, pericarditis, or injury  Abnormal ECG  When compared with ECG of 17-SEP-2022 09:59,  Vent. rate has decreased BY  40 BPM  
  Ventricular Rate 120 BPM    Atrial Rate 120 BPM    P-R Interval 176 ms    QRS Duration 82 ms    Q-T Interval 322 ms    QTC Calculation(Bazett) 455 ms    P Axis 74 degrees    R Axis 89 degrees    T Axis 49 degrees    Diagnosis Line Sinus tachycardia  Possible Left atrial enlargement  Nonspecific ST abnormality  Abnormal ECG
Ventricular Rate 80 BPM    Atrial Rate 80 BPM    P-R Interval 178 ms    QRS Duration 88 ms    Q-T Interval 392 ms    QTC Calculation(Bazett) 452 ms    P Axis 79 degrees    R Axis 87 degrees    T Axis 60 degrees    Diagnosis Line Normal sinus rhythm  ST elevation, consider early repolarization, pericarditis, or injury  Abnormal ECG  When compared with ECG of 17-SEP-2022 09:59,  Vent. rate has decreased BY  40 BPM  
QRS axis to [] ° and NSR at a rate of [] BPM. There was no atrial enlargement. There was no ventricular hypertrophy. There were no ST-T changes and all intervals were normal.  
Ventricular Rate 80 BPM    Atrial Rate 80 BPM    P-R Interval 178 ms    QRS Duration 88 ms    Q-T Interval 392 ms    QTC Calculation(Bazett) 452 ms    P Axis 79 degrees    R Axis 87 degrees    T Axis 60 degrees    Diagnosis Line Normal sinus rhythm  ST elevation, consider early repolarization, pericarditis, or injury  Abnormal ECG  When compared with ECG of 17-SEP-2022 09:59,  Vent. rate has decreased BY  40 BPM  
  Ventricular Rate 120 BPM    Atrial Rate 120 BPM    P-R Interval 176 ms    QRS Duration 82 ms    Q-T Interval 322 ms    QTC Calculation(Bazett) 455 ms    P Axis 74 degrees    R Axis 89 degrees    T Axis 49 degrees    Diagnosis Line Sinus tachycardia  Possible Left atrial enlargement  Nonspecific ST abnormality  Abnormal ECG
  Ventricular Rate 120 BPM    Atrial Rate 120 BPM    P-R Interval 176 ms    QRS Duration 82 ms    Q-T Interval 322 ms    QTC Calculation(Bazett) 455 ms    P Axis 74 degrees    R Axis 89 degrees    T Axis 49 degrees    Diagnosis Line Sinus tachycardia  Possible Left atrial enlargement  Nonspecific ST abnormality  Abnormal ECG
Ventricular Rate 89 BPM    Atrial Rate 89 BPM    P-R Interval 188 ms    QRS Duration 88 ms    Q-T Interval 372 ms    QTC Calculation(Bazett) 452 ms    P Axis 79 degrees    R Axis 93 degrees    T Axis 47 degrees    Diagnosis Line Normal sinus rhythmwith sinus arrhythmia  Rightward axis  Borderline ECG  No previous ECGs available  Confirmed by JENNA ESCOBAR MD ([id number]) on 8/16/2021 7:37:19 PM
  Ventricular Rate 120 BPM    Atrial Rate 120 BPM    P-R Interval 176 ms    QRS Duration 82 ms    Q-T Interval 322 ms    QTC Calculation(Bazett) 455 ms    P Axis 74 degrees    R Axis 89 degrees    T Axis 49 degrees    Diagnosis Line Sinus tachycardia  Possible Left atrial enlargement  Nonspecific ST abnormality  Abnormal ECG

## 2022-09-29 NOTE — BH CONSULTATION LIAISON PROGRESS NOTE - NSBHMSEAFFRANGE_PSY_A_CORE
Unable to assess
Constricted
Full

## 2022-09-29 NOTE — BH CONSULTATION LIAISON PROGRESS NOTE - NSBHMSETHTASSOC_PSY_A_CORE
Normal
Unable to assess
Unable to assess
Loose
Unable to assess
Unable to assess
Loose
Unable to assess

## 2022-09-29 NOTE — DISCHARGE NOTE NURSING/CASE MANAGEMENT/SOCIAL WORK - NSDCPNINST_GEN_ALL_CORE
Pt being transported to [facility] via behavioral health EMS. EMS in possession of pts belongings including valuables from security, books, cell phone and games.

## 2022-09-29 NOTE — BH CONSULTATION LIAISON PROGRESS NOTE - NSBHMSEKNOW_PSY_A_CORE
Impaired
Unable to assess
Impaired
Unable to assess
Unable to assess
Normal
Normal
Unable to assess

## 2022-09-29 NOTE — BH CONSULTATION LIAISON PROGRESS NOTE - NSBHMSELANG_PSY_A_CORE
No abnormalities noted
Unable to assess
Unable to assess
No abnormalities noted
Impaired repetition
Unable to assess
No abnormalities noted

## 2022-09-29 NOTE — BH CONSULTATION LIAISON PROGRESS NOTE - NSBHATTESTTYPEVISIT_PSY_A_CORE
Attending Only
On-site Attending supervising MIRANDA (99XXX codes)
Attending Only
Attending Only
On-site Attending supervising MIRANDA (99XXX codes)
MIRANDA without on-site Attending supervision
On-site Attending supervising MIRANDA (99XXX codes)
On-site Attending supervising MIRANDA (99XXX codes)
Attending Only
On-site Attending supervising MIRANDA (99XXX codes)

## 2022-09-29 NOTE — BH CONSULTATION LIAISON PROGRESS NOTE - NSBHCONSULTRECOMMENDOTHER_PSY_A_CORE FT
Requires psych admission***
-Encourage PO fluids
Requires psych admission***
Requires psych admission***
continuation of depakote orders and follow up on serum levels 
-Ween patient off IV medications and fluids  -Encourage PO fluids
Requires psych admission***

## 2022-09-29 NOTE — DISCHARGE NOTE NURSING/CASE MANAGEMENT/SOCIAL WORK - PATIENT PORTAL LINK FT
You can access the FollowMyHealth Patient Portal offered by [facility] by registering at the following website: [email or web address]. By joining [employer]’s FollowMyHealth portal, you will also be able to view your health information using other applications (apps) compatible with our system.

## 2022-09-29 NOTE — DISCHARGE NOTE NURSING/CASE MANAGEMENT/SOCIAL WORK - NSDCVIVACCINE_GEN_ALL_CORE_FT
Tdap; 17-Sep-2022 09:57; Adelina Chang (RN); [employer]; [id number] (Exp. Date: 14-Oct-2024); IntraMuscular; Deltoid Left.; 0.5 milliLiter(s); VIS (VIS Published: 09-May-2013, VIS Presented: 17-Sep-2022);

## 2022-09-29 NOTE — BH CONSULTATION LIAISON PROGRESS NOTE - NSBHCHARTREVIEWLAB_PSY_A_CORE FT
09-28    140  |  106  |  9   ----------------------------<  96  3.2<L>   |  26  |  0.84    Ca    9.3      28 Sep 2022 07:37  Phos  3.6     09-28  Mg     2.3     09-28                          15.8   14.45 )-----------( 415      ( 28 Sep 2022 07:37 )             46.6   
                      15.2   15.69 )-----------( 257      ( 19 Sep 2022 05:25 )             45.2   09-19    140  |  111<H>  |  20  ----------------------------<  77  3.7   |  21<L>  |  1.32<H>    Ca    8.7      19 Sep 2022 05:25  Phos  4.8     09-19  Mg     2.5     09-19    TPro  5.9<L>  /  Alb  3.0<L>  /  TBili  0.4  /  DBili  x   /  AST  101<H>  /  ALT  43  /  AlkPhos  98  09-19  
                      15.8   14.45 )-----------( 415      ( 28 Sep 2022 07:37 )             46.6   09-29    142  |  108  |  10  ----------------------------<  144<H>  3.7   |  29  |  0.82    Ca    9.3      29 Sep 2022 10:56  Phos  3.6     09-28  Mg     2.3     09-28        
09-20    140  |  109<H>  |  12  ----------------------------<  81  3.5   |  23  |  1.13    Ca    9.1      20 Sep 2022 05:34  Phos  3.1     09-20  Mg     1.9     09-20    TPro  5.9<L>  /  Alb  3.0<L>  /  TBili  0.4  /  DBili  x   /  AST  101<H>  /  ALT  43  /  AlkPhos  98  09-19                          15.7   15.61 )-----------( 275      ( 20 Sep 2022 05:34 )             45.5     
09-22    140  |  108  |  4<L>  ----------------------------<  100<H>  3.5   |  27  |  0.80    Ca    9.4      22 Sep 2022 05:37  Phos  2.6     09-22  Mg     1.6     09-22                          15.8   11.95 )-----------( 299      ( 22 Sep 2022 05:37 )             45.0     
09-22    140  |  108  |  4<L>  ----------------------------<  100<H>  3.5   |  27  |  0.80    Ca    9.4      22 Sep 2022 05:37  Phos  2.6     09-22  Mg     1.6     09-22                          15.8   11.95 )-----------( 299      ( 22 Sep 2022 05:37 )             45.0     
09-21    141  |  110<H>  |  4<L>  ----------------------------<  122<H>  3.6   |  28  |  0.67    Ca    9.1      21 Sep 2022 05:39  Phos  2.3     09-21  Mg     1.8     09-21                          14.8   12.20 )-----------( 275      ( 21 Sep 2022 05:39 )             42.3

## 2022-09-29 NOTE — BH CONSULTATION LIAISON PROGRESS NOTE - NSBHCONSULTMEDAGITATION_PSY_A_CORE FT
LORazepam     Tablet 2 milliGRAM(s) Oral every 4 hours PRN MODERATE AGITATION  haloperidol     Tablet 5 milliGRAM(s) Oral every 6 hours PRN MODERATE PSYCHOTIC AGITATION  diphenhydrAMINE 50 milliGRAM(s) Oral every 6 hours PRN EPS  
see above 
LORazepam     Tablet 2 milliGRAM(s) Oral every 4 hours PRN MODERATE AGITATION  haloperidol     Tablet 5 milliGRAM(s) Oral every 6 hours PRN MODERATE PSYCHOTIC AGITATION  diphenhydrAMINE 50 milliGRAM(s) Oral every 6 hours PRN EPS  
LORazepam     Tablet 2 milliGRAM(s) Oral every 4 hours PRN MODERATE AGITATION  haloperidol     Tablet 5 milliGRAM(s) Oral every 6 hours PRN MODERATE PSYCHOTIC AGITATION  diphenhydrAMINE 50 milliGRAM(s) Oral every 6 hours PRN EPS  
haloperidol     Tablet 5 milliGRAM(s) Oral every 6 hours PRN moderate psychitic agitation  LORazepam     Tablet 2 milliGRAM(s) Oral every 4 hours PRN moderate agitation  
LORazepam     Tablet 2 milliGRAM(s) Oral every 4 hours PRN MODERATE AGITATION  haloperidol     Tablet 5 milliGRAM(s) Oral every 6 hours PRN MODERATE PSYCHOTIC AGITATION  diphenhydrAMINE 50 milliGRAM(s) Oral every 6 hours PRN EPS

## 2022-09-29 NOTE — BH CONSULTATION LIAISON PROGRESS NOTE - NSBHMSEMUSCLE_PSY_A_CORE
Unable to assess
Other
Unable to assess
Normal muscle tone/strength
Unable to assess
Normal muscle tone/strength
Other

## 2022-09-29 NOTE — BH CONSULTATION LIAISON PROGRESS NOTE - NSBHPTASSESSDT_PSY_A_CORE
22-Sep-2022 10:03
21-Sep-2022 16:14
29-Sep-2022 12:37
27-Sep-2022 12:17
20-Sep-2022 16:21
24-Sep-2022 14:29
21-Sep-2022 15:18
19-Sep-2022 15:13
23-Sep-2022 10:39
28-Sep-2022 11:27

## 2022-09-29 NOTE — DISCHARGE NOTE NURSING/CASE MANAGEMENT/SOCIAL WORK - NSDCPEFALRISK_GEN_ALL_CORE
For information on Fall & Injury Prevention, visit: [email or web address] OR  [email or web address] OR  https://www.cdc.gov/steadi/patient.html
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
## 2022-09-29 NOTE — BH CONSULTATION LIAISON PROGRESS NOTE - NSBHMSETHTPROC_PSY_A_CORE
Illogical/Impaired reasoning
Disorganized/Illogical/Impaired reasoning
Unable to assess
Disorganized/Illogical/Impaired reasoning
Illogical/Impaired reasoning
Disorganized/Illogical/Impaired reasoning
Linear/Illogical/Impaired reasoning

## 2022-09-29 NOTE — BH CONSULTATION LIAISON PROGRESS NOTE - NSBHMSERECMEM_PSY_A_CORE
Normal
Unable to assess
Impaired
Impaired
Unable to assess
Normal
Impaired
Unable to assess

## 2022-09-29 NOTE — BH CONSULTATION LIAISON PROGRESS NOTE - NSBHMSEGAIT_PSY_A_CORE
Unable to assess
Other
Unable to assess

## 2022-09-29 NOTE — BH CONSULTATION LIAISON PROGRESS NOTE - NSBHMSEREMMEM_PSY_A_CORE
Unable to assess
Normal
Normal
Unable to assess
Normal
Normal
Unable to assess
Normal

## 2022-09-29 NOTE — BH CONSULTATION LIAISON PROGRESS NOTE - NSBHINDICATION_PSY_ALL_CORE
pt at risk for injury to others and self  in response to internal stimuli
HIGH ACUTE RISK TO HARM OTHERS AND SELF . 
safety
HIGH ACUTE RISK TO HARM OTHERS AND SELF . 
safety
HIGH ACUTE RISK TO HARM OTHERS AND SELF . 
safety
safety
HIGH ACUTE RISK TO HARM OTHERS AND SELF . 
HIGH ACUTE RISK TO HARM OTHERS AND SELF .

## 2022-09-29 NOTE — BH CONSULTATION LIAISON PROGRESS NOTE - NSBHCONSULTPRIMARYDISCUSSYES_PSY_A_CORE FT
Cares coordinated with treatment team. 
Dr King 
RN
Dr. King 
Dr King and CSW 
pt is needing to continue with inpatient psychiatric treatment. Pt is to be transferred to [facility] once patient is   . medically cleared  1 CK levels are normalized   2. medically cleared  3. pt is off IV medications  
Dr King

## 2022-09-29 NOTE — CHART NOTE - NSCHARTNOTESELECT_GEN_ALL_CORE
Event Note
Supportive Counseling/Event Note
Event Note
Psych SW Note/Event Note
supportive counseling/Event Note
Event Note
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
## 2022-09-29 NOTE — BH CONSULTATION LIAISON PROGRESS NOTE - NSBHPSYCHOLCOGORIENT_PSY_A_CORE
Unable to assess
Unable to assess
Not fully oriented...
Unable to assess
Not fully oriented...
Not fully oriented...
Unable to assess
Not fully oriented...
Oriented to time, place, person, situation
Oriented to time, place, person, situation

## 2022-09-29 NOTE — BH CONSULTATION LIAISON PROGRESS NOTE - NSBHMSEMOVE_PSY_A_CORE
No abnormal movements
No abnormal movements/Unable to assess
No abnormal movements
Unable to assess
Unable to assess
No abnormal movements
Unable to assess

## 2022-09-29 NOTE — BH CONSULTATION LIAISON PROGRESS NOTE - CURRENT MEDICATION
MEDICATIONS  (STANDING):  dextrose 5% + sodium chloride 0.45% with potassium chloride 20 mEq/L 1000 milliLiter(s) (200 mL/Hr) IV Continuous <Continuous>  diVALproex  milliGRAM(s) Oral two times a day  haloperidol     Tablet 5 milliGRAM(s) Oral two times a day  haloperidol    Injectable 5 milliGRAM(s) IntraMuscular once  heparin   Injectable 5000 Unit(s) SubCutaneous every 8 hours    MEDICATIONS  (PRN):  diphenhydrAMINE 50 milliGRAM(s) Oral every 6 hours PRN EPS  diphenhydrAMINE Injectable 50 milliGRAM(s) IntraMuscular once PRN Extrapyramidal prophylaxis  haloperidol     Tablet 5 milliGRAM(s) Oral every 6 hours PRN MODERATE PSYCHOTIC AGITATION  haloperidol    Injectable 5 milliGRAM(s) IntraMuscular once PRN severe psychotic agitation  LORazepam     Tablet 2 milliGRAM(s) Oral every 4 hours PRN MODERATE AGITATION  LORazepam   Injectable 2 milliGRAM(s) IntraMuscular once PRN severe psychotic agitation  
MEDICATIONS  (STANDING):  chlorhexidine 0.12% Liquid 15 milliLiter(s) Oral Mucosa every 12 hours  chlorhexidine 2% Cloths 1 Application(s) Topical <User Schedule>  dexMEDEtomidine Infusion 1 MICROgram(s)/kG/Hr (17 mL/Hr) IV Continuous <Continuous>  diVALproex  milliGRAM(s) Oral two times a day  fentaNYL   Infusion 0.5 MICROgram(s)/kG/Hr (3.4 mL/Hr) IV Continuous <Continuous>  heparin   Injectable 5000 Unit(s) SubCutaneous every 8 hours  lactated ringers. 1000 milliLiter(s) (125 mL/Hr) IV Continuous <Continuous>  midazolam Infusion 0.02 mG/kG/Hr (1.36 mL/Hr) IV Continuous <Continuous>  pantoprazole  Injectable 40 milliGRAM(s) IV Push daily  propofol Infusion 40 MICROgram(s)/kG/Min (18.9 mL/Hr) IV Continuous <Continuous>  risperiDONE   Tablet 3.5 milliGRAM(s) Oral two times a day  sertraline 100 milliGRAM(s) Oral daily    MEDICATIONS  (PRN):  diphenhydrAMINE 50 milliGRAM(s) Oral every 6 hours PRN EPS  diphenhydrAMINE Injectable 50 milliGRAM(s) IntraMuscular every 6 hours PRN EPS  haloperidol     Tablet 5 milliGRAM(s) Oral every 6 hours PRN moderate psychitic agitation  haloperidol    Injectable 5 milliGRAM(s) IntraMuscular every 6 hours PRN severe psychotic agitation  LORazepam     Tablet 2 milliGRAM(s) Oral every 4 hours PRN moderate agitation  LORazepam   Injectable 2 milliGRAM(s) IntraMuscular every 4 hours PRN SEVERE AGITATION  
MEDICATIONS  (STANDING):  diVALproex  milliGRAM(s) Oral two times a day  haloperidol     Tablet 5 milliGRAM(s) Oral three times a day  LORazepam     Tablet 2 milliGRAM(s) Oral every 4 hours  OLANZapine 5 milliGRAM(s) Oral at bedtime    MEDICATIONS  (PRN):  diphenhydrAMINE Injectable 50 milliGRAM(s) IntraMuscular once PRN Extrapyramidal prophylaxis  LORazepam   Injectable 2 milliGRAM(s) IntraMuscular every 4 hours PRN severe agitation  OLANZapine 5 milliGRAM(s) Oral every 6 hours PRN moderate psychotic  agitation  OLANZapine Injectable 5 milliGRAM(s) IntraMuscular every 6 hours PRN severe psychotc agitation  
MEDICATIONS  (STANDING):  dexMEDEtomidine Infusion 1.5 MICROgram(s)/kG/Hr (29.5 mL/Hr) IV Continuous <Continuous>  dextrose 5% + sodium chloride 0.45% with potassium chloride 20 mEq/L 1000 milliLiter(s) (200 mL/Hr) IV Continuous <Continuous>  diVALproex  milliGRAM(s) Oral two times a day  haloperidol     Tablet 5 milliGRAM(s) Oral two times a day  haloperidol    Injectable 5 milliGRAM(s) IntraMuscular once  heparin   Injectable 5000 Unit(s) SubCutaneous every 8 hours  magnesium sulfate  IVPB 1 Gram(s) IV Intermittent once  pantoprazole  Injectable 40 milliGRAM(s) IV Push daily    MEDICATIONS  (PRN):  diphenhydrAMINE 50 milliGRAM(s) Oral every 6 hours PRN EPS  diphenhydrAMINE Injectable 50 milliGRAM(s) IntraMuscular every 6 hours PRN Extrapyramidal prophylaxis  haloperidol     Tablet 5 milliGRAM(s) Oral every 6 hours PRN MODERATE PSYCHOTIC AGITATION  haloperidol    Injectable 5 milliGRAM(s) IntraMuscular every 6 hours PRN Severe psychotic agitation  LORazepam     Tablet 2 milliGRAM(s) Oral every 4 hours PRN MODERATE AGITATION  LORazepam   Injectable 2 milliGRAM(s) IntraMuscular every 4 hours PRN Severe psychotic agitation  
MEDICATIONS  (STANDING):  dexMEDEtomidine Infusion 1 MICROgram(s)/kG/Hr (17 mL/Hr) IV Continuous <Continuous>  dextrose 5% + sodium chloride 0.45% with potassium chloride 20 mEq/L 1000 milliLiter(s) (200 mL/Hr) IV Continuous <Continuous>  heparin   Injectable 5000 Unit(s) SubCutaneous every 8 hours  pantoprazole  Injectable 40 milliGRAM(s) IV Push daily  risperiDONE   Tablet 3.5 milliGRAM(s) Oral two times a day  sertraline 100 milliGRAM(s) Oral daily  valproate sodium  IVPB 250 milliGRAM(s) IV Intermittent every 12 hours    MEDICATIONS  (PRN):  diphenhydrAMINE 50 milliGRAM(s) Oral every 6 hours PRN EPS  diphenhydrAMINE Injectable 50 milliGRAM(s) IV Push every 6 hours PRN EPS  haloperidol     Tablet 5 milliGRAM(s) Oral every 6 hours PRN MODERATE PSYCHOTIC AGITATION  haloperidol    Injectable 5 milliGRAM(s) IV Push every 6 hours PRN SEVERE PSYCHOTIC AGITATION  LORazepam     Tablet 2 milliGRAM(s) Oral every 4 hours PRN MODERATE AGITATION  LORazepam   Injectable 2 milliGRAM(s) IV Push every 4 hours PRN severe agitation  
MEDICATIONS  (STANDING):  dexMEDEtomidine Infusion 1 MICROgram(s)/kG/Hr (17 mL/Hr) IV Continuous <Continuous>  dextrose 5% + sodium chloride 0.45% with potassium chloride 20 mEq/L 1000 milliLiter(s) (200 mL/Hr) IV Continuous <Continuous>  heparin   Injectable 5000 Unit(s) SubCutaneous every 8 hours  pantoprazole  Injectable 40 milliGRAM(s) IV Push daily  risperiDONE   Tablet 3.5 milliGRAM(s) Oral two times a day  sertraline 100 milliGRAM(s) Oral daily  valproate sodium  IVPB 250 milliGRAM(s) IV Intermittent every 12 hours    MEDICATIONS  (PRN):  diphenhydrAMINE 50 milliGRAM(s) Oral every 6 hours PRN EPS  diphenhydrAMINE Injectable 50 milliGRAM(s) IV Push every 6 hours PRN EPS  haloperidol     Tablet 5 milliGRAM(s) Oral every 6 hours PRN MODERATE PSYCHOTIC AGITATION  haloperidol    Injectable 5 milliGRAM(s) IV Push every 6 hours PRN SEVERE PSYCHOTIC AGITATION  LORazepam     Tablet 2 milliGRAM(s) Oral every 4 hours PRN MODERATE AGITATION  LORazepam   Injectable 2 milliGRAM(s) IV Push every 4 hours PRN severe agitation  
MEDICATIONS  (STANDING):  dexMEDEtomidine Infusion 1 MICROgram(s)/kG/Hr (17 mL/Hr) IV Continuous <Continuous>  dextrose 5% + sodium chloride 0.45% with potassium chloride 20 mEq/L 1000 milliLiter(s) (200 mL/Hr) IV Continuous <Continuous>  diVALproex  milliGRAM(s) Oral two times a day  heparin   Injectable 5000 Unit(s) SubCutaneous every 8 hours  pantoprazole  Injectable 40 milliGRAM(s) IV Push daily  risperiDONE   Tablet 3.5 milliGRAM(s) Oral two times a day    MEDICATIONS  (PRN):  diphenhydrAMINE 50 milliGRAM(s) Oral every 6 hours PRN EPS  diphenhydrAMINE Injectable 50 milliGRAM(s) IntraMuscular every 6 hours PRN Extrapyramidal prophylaxis  haloperidol     Tablet 5 milliGRAM(s) Oral every 6 hours PRN MODERATE PSYCHOTIC AGITATION  haloperidol    Injectable 5 milliGRAM(s) IntraMuscular every 6 hours PRN Severe psychotic agitation  LORazepam     Tablet 2 milliGRAM(s) Oral every 4 hours PRN MODERATE AGITATION  LORazepam   Injectable 2 milliGRAM(s) IntraMuscular every 4 hours PRN Severe psychotic agitation  
MEDICATIONS  (STANDING):  diVALproex DR 1000 milliGRAM(s) Oral two times a day  haloperidol     Tablet 5 milliGRAM(s) Oral three times a day  heparin   Injectable 5000 Unit(s) SubCutaneous every 8 hours  LORazepam     Tablet 2 milliGRAM(s) Oral every 4 hours  OLANZapine 5 milliGRAM(s) Oral at bedtime    MEDICATIONS  (PRN):  diphenhydrAMINE Injectable 50 milliGRAM(s) IntraMuscular once PRN Extrapyramidal prophylaxis  LORazepam   Injectable 2 milliGRAM(s) IntraMuscular every 4 hours PRN severe agitation  OLANZapine 5 milliGRAM(s) Oral every 6 hours PRN moderate psychotic  agitation  OLANZapine Injectable 5 milliGRAM(s) IntraMuscular every 6 hours PRN severe psychotc agitation  
MEDICATIONS  (STANDING):  dextrose 5% + sodium chloride 0.45% with potassium chloride 20 mEq/L 1000 milliLiter(s) (200 mL/Hr) IV Continuous <Continuous>  diVALproex  milliGRAM(s) Oral two times a day  haloperidol     Tablet 5 milliGRAM(s) Oral two times a day  haloperidol    Injectable 5 milliGRAM(s) IntraMuscular once  heparin   Injectable 5000 Unit(s) SubCutaneous every 8 hours  pantoprazole  Injectable 40 milliGRAM(s) IV Push daily    MEDICATIONS  (PRN):  diphenhydrAMINE 50 milliGRAM(s) Oral every 6 hours PRN EPS  diphenhydrAMINE Injectable 50 milliGRAM(s) IntraMuscular once PRN Extrapyramidal prophylaxis  haloperidol     Tablet 5 milliGRAM(s) Oral every 6 hours PRN MODERATE PSYCHOTIC AGITATION  haloperidol    Injectable 5 milliGRAM(s) IntraMuscular once PRN severe psychotic agitation  LORazepam     Tablet 2 milliGRAM(s) Oral every 4 hours PRN MODERATE AGITATION  LORazepam   Injectable 2 milliGRAM(s) IntraMuscular once PRN severe psychotic agitation  
MEDICATIONS  (STANDING):  diVALproex  milliGRAM(s) Oral two times a day  haloperidol     Tablet 5 milliGRAM(s) Oral three times a day  heparin   Injectable 5000 Unit(s) SubCutaneous every 8 hours  LORazepam     Tablet 2 milliGRAM(s) Oral every 4 hours  OLANZapine 5 milliGRAM(s) Oral at bedtime  potassium chloride   Powder 40 milliEquivalent(s) Oral once    MEDICATIONS  (PRN):  diphenhydrAMINE Injectable 50 milliGRAM(s) IntraMuscular once PRN Extrapyramidal prophylaxis  LORazepam   Injectable 2 milliGRAM(s) IntraMuscular every 4 hours PRN severe agitation  OLANZapine 5 milliGRAM(s) Oral every 6 hours PRN moderate psychotic  agitation  OLANZapine Injectable 5 milliGRAM(s) IntraMuscular every 6 hours PRN severe psychotc agitation

## 2022-09-29 NOTE — BH CONSULTATION LIAISON PROGRESS NOTE - NSBHFUPINTERVALHXFT_PSY_A_CORE
Patient seen at bedside while  eating his breakfast, god appetite. No agitation last 24 h.  he makes eye contact, communicates. Appears suspicious , frequently asking  why is some questions asked, avoiding to answer the question if he thinks somebody is trying to harm him. Today reports hearing voces, man's voice, asking him how is he. No CAH telling him to harm self and others.

## 2022-09-29 NOTE — DISCHARGE NOTE PROVIDER - ATTENDING DISCHARGE PHYSICAL EXAMINATION:
PHYSICAL EXAM  General: NAD  CNS: AAOx3, no focal deficit   HEENT: PERRL   Resp: CTA b/l   CVS: S1S2 regular   Abd: soft, NT, +BS  Ext: no edema   Skin:

## 2022-09-29 NOTE — BH CONSULTATION LIAISON PROGRESS NOTE - NSBHCHARTREVIEWVS_PSY_A_CORE FT
Vital Signs Last 24 Hrs  T(C): 36.7 (22 Sep 2022 08:00), Max: 37.2 (21 Sep 2022 16:00)  T(F): 98 (22 Sep 2022 08:00), Max: 99 (21 Sep 2022 16:00)  HR: 83 (22 Sep 2022 09:00) (43 - 111)  BP: 129/60 (22 Sep 2022 09:00) (123/74 - 148/94)  BP(mean): 74 (22 Sep 2022 09:00) (74 - 112)  RR: 17 (22 Sep 2022 09:00) (14 - 31)  SpO2: 98% (22 Sep 2022 09:00) (93% - 100%)    Parameters below as of 22 Sep 2022 09:00  Patient On (Oxygen Delivery Method): room air        
Vital Signs Last 24 Hrs  T(C): 36.9 (26 Sep 2022 17:00), Max: 36.9 (26 Sep 2022 17:00)  T(F): 98.4 (26 Sep 2022 17:00), Max: 98.4 (26 Sep 2022 17:00)  HR: 140 (27 Sep 2022 11:00) (46 - 140)  BP: 140/83 (27 Sep 2022 11:00) (110/88 - 140/83)  BP(mean): 97 (27 Sep 2022 11:00) (71 - 97)  RR: 22 (27 Sep 2022 11:00) (13 - 25)  SpO2: 95% (27 Sep 2022 11:00) (95% - 99%)    Parameters below as of 27 Sep 2022 11:00  Patient On (Oxygen Delivery Method): room air    
Vital Signs Last 24 Hrs  T(C): 36.7 (21 Sep 2022 12:00), Max: 36.7 (21 Sep 2022 08:00)  T(F): 98.1 (21 Sep 2022 12:00), Max: 98.1 (21 Sep 2022 08:00)  HR: 63 (21 Sep 2022 14:00) (43 - 111)  BP: 130/84 (21 Sep 2022 14:00) (127/88 - 149/115)  BP(mean): 95 (21 Sep 2022 14:00) (89 - 122)  RR: 24 (21 Sep 2022 14:00) (15 - 29)  SpO2: 99% (21 Sep 2022 14:00) (96% - 100%)    Parameters below as of 21 Sep 2022 14:00  Patient On (Oxygen Delivery Method): room air    
Vital Signs Last 24 Hrs  T(C): 37.4 (20 Sep 2022 14:00), Max: 37.4 (20 Sep 2022 14:00)  T(F): 99.4 (20 Sep 2022 14:00), Max: 99.4 (20 Sep 2022 14:00)  HR: 73 (20 Sep 2022 15:00) (53 - 153)  BP: 145/92 (20 Sep 2022 15:00) (128/75 - 153/111)  BP(mean): 106 (20 Sep 2022 15:00) (3 - 121)  RR: 26 (20 Sep 2022 15:00) (19 - 32)  SpO2: 91% (20 Sep 2022 15:00) (90% - 100%)    Parameters below as of 20 Sep 2022 15:00  Patient On (Oxygen Delivery Method): room air    
Vital Signs Last 24 Hrs  T(C): 36.6 (24 Sep 2022 14:23), Max: 36.7 (24 Sep 2022 00:00)  T(F): 97.8 (24 Sep 2022 14:23), Max: 98.1 (24 Sep 2022 00:00)  HR: 97 (24 Sep 2022 00:00) (97 - 97)  BP: 159/97 (24 Sep 2022 14:23) (131/84 - 159/97)  BP(mean): 110 (24 Sep 2022 14:23) (94 - 110)  RR: --  SpO2: --    
Vital Signs Last 24 Hrs  T(C): --  T(F): --  HR: 98 (28 Sep 2022 02:00) (98 - 160)  BP: 134/92 (28 Sep 2022 02:00) (117/72 - 142/79)  BP(mean): 89 (28 Sep 2022 02:00) (80 - 99)  RR: 20 (28 Sep 2022 02:00) (20 - 28)  SpO2: 98% (28 Sep 2022 02:00) (94% - 98%)    Parameters below as of 28 Sep 2022 02:00  Patient On (Oxygen Delivery Method): room air    
Vital Signs Last 24 Hrs  T(C): 35.8 (19 Sep 2022 11:50), Max: 36.6 (18 Sep 2022 16:00)  T(F): 96.5 (19 Sep 2022 11:50), Max: 97.9 (18 Sep 2022 16:00)  HR: 131 (19 Sep 2022 14:00) (50 - 131)  BP: 117/67 (19 Sep 2022 14:00) (102/69 - 128/66)  BP(mean): 77 (19 Sep 2022 14:00) (76 - 93)  RR: 27 (19 Sep 2022 14:00) (14 - 27)  SpO2: 93% (19 Sep 2022 14:00) (92% - 100%)    Parameters below as of 19 Sep 2022 14:00  Patient On (Oxygen Delivery Method): nasal cannula  O2 Flow (L/min): 2  
Vital Signs Last 24 Hrs  T(C): 36.1 (22 Sep 2022 16:00), Max: 36.7 (22 Sep 2022 12:00)  T(F): 97 (22 Sep 2022 16:00), Max: 98 (22 Sep 2022 12:00)  HR: 131 (23 Sep 2022 08:00) (59 - 142)  BP: 144/95 (23 Sep 2022 07:00) (108/48 - 167/88)  BP(mean): 105 (23 Sep 2022 07:00) (57 - 109)  RR: 31 (23 Sep 2022 08:00) (12 - 37)  SpO2: 99% (23 Sep 2022 05:00) (96% - 100%)    Parameters below as of 23 Sep 2022 04:00  Patient On (Oxygen Delivery Method): room air    
Vital Signs Last 24 Hrs  T(C): --  T(F): --  HR: 88 (29 Sep 2022 08:00) (88 - 108)  BP: 132/83 (28 Sep 2022 17:55) (128/77 - 132/95)  BP(mean): 92 (28 Sep 2022 17:55) (88 - 104)  RR: 20 (28 Sep 2022 17:55) (20 - 20)  SpO2: 97% (28 Sep 2022 17:55) (93% - 99%)    Parameters below as of 28 Sep 2022 17:55  Patient On (Oxygen Delivery Method): room air    
Vital Signs Last 24 Hrs  T(C): 35.9 (20 Sep 2022 09:00), Max: 37 (19 Sep 2022 20:00)  T(F): 96.6 (20 Sep 2022 09:00), Max: 98.6 (19 Sep 2022 20:00)  HR: 68 (20 Sep 2022 14:00) (53 - 153)  BP: 144/109 (20 Sep 2022 14:00) (128/75 - 153/111)  BP(mean): 117 (20 Sep 2022 14:00) (3 - 121)  RR: 32 (20 Sep 2022 14:00) (19 - 32)  SpO2: 98% (20 Sep 2022 14:00) (90% - 100%)    Parameters below as of 20 Sep 2022 14:00  Patient On (Oxygen Delivery Method): room air

## 2022-09-29 NOTE — BH CONSULTATION LIAISON PROGRESS NOTE - [id number]_[id number]_CORE
Abnormal as indicated, otherwise normal...
Normal volume, rate, productivity, spontaneity and articulation
Non-verbal: unable to assess speech further
Normal volume, rate, productivity, spontaneity and articulation
Abnormal as indicated, otherwise normal...
Normal volume, rate, productivity, spontaneity and articulation
Abnormal as indicated, otherwise normal...
Abnormal as indicated, otherwise normal...
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
## 2022-09-29 NOTE — BH CONSULTATION LIAISON PROGRESS NOTE - NSICDXBHSECONDARYDX_PSY_ALL_CORE
Cannabis abuse   F12.10  

## 2022-09-29 NOTE — BH CONSULTATION LIAISON PROGRESS NOTE - NSBHMSETHTCONTENT_PSY_A_CORE
Delusions/Unable to assess
Delusions
Unable to assess
Unremarkable
Delusions
Unremarkable
Preoccupations
Unremarkable
Delusions

## 2022-09-29 NOTE — BH CONSULTATION LIAISON PROGRESS NOTE - NSBHMSEAFFCONG_PSY_A_CORE
Non-congruent
Congruent
Unable to assess
Congruent
Unable to assess
Congruent

## 2022-09-29 NOTE — BH CONSULTATION LIAISON PROGRESS NOTE - NSBHMSEINTELL_PSY_A_CORE
Unable to assess
Average
Below Average
Average
Unable to assess
Average
Average
Unable to assess
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
## 2022-10-07 DIAGNOSIS — J96.01 ACUTE RESPIRATORY FAILURE WITH HYPOXIA: ICD-10-CM

## 2022-10-07 DIAGNOSIS — N17.9 ACUTE KIDNEY FAILURE, UNSPECIFIED: ICD-10-CM

## 2022-10-07 DIAGNOSIS — F20.9 SCHIZOPHRENIA, UNSPECIFIED: ICD-10-CM

## 2022-10-07 DIAGNOSIS — E87.6 HYPOKALEMIA: ICD-10-CM

## 2022-10-07 DIAGNOSIS — T42.75XA ADVERSE EFFECT OF UNSPECIFIED ANTIEPILEPTIC AND SEDATIVE-HYPNOTIC DRUGS, INITIAL ENCOUNTER: ICD-10-CM

## 2022-10-07 DIAGNOSIS — M62.82 RHABDOMYOLYSIS: ICD-10-CM

## 2022-10-07 DIAGNOSIS — F23 BRIEF PSYCHOTIC DISORDER: ICD-10-CM

## 2022-11-04 ENCOUNTER — TRANSCRIPTION ENCOUNTER (OUTPATIENT)
Age: 22
End: 2022-11-04

## 2023-03-03 NOTE — PROGRESS NOTE ADULT - TIME BILLING
see above
Acute psychosis   Substance abuse     PMH schizophrenia     Depakote increased to 1000 mg q12 by psych  On Ativan, Haldol, Zyprexa - standing and PRN     Needs leather restraints to prevent violent outbursts which happen without warning.     He has been refusing a lot of po meds and when he takes them he spits them out, will keep talking to him and encourage to take po meds.     Break off restraints if possible as d/w psych/administration     On CO + being watched by security at all times    Takes po diet, liquids  If calmer will let him use the bathroom   Replete hypokalemia   CPK is elevated however renal fn is normal, he is taking liquids and does not have IV access for IVF - no further intervention       Plan d/w psychiatry and administration     Patient's outside psychiatry also updated by phone
see above
F33.2

## 2023-03-07 ENCOUNTER — NON-APPOINTMENT (OUTPATIENT)
Age: 23
End: 2023-03-07

## 2023-03-07 NOTE — PROGRESS NOTE BEHAVIORAL HEALTH - NSBHADMITDANGEROTHERS_PSY_A_CORE
assaultive behavior/assualtive threats with plan and means
A side
assaultive behavior/assualtive threats with plan and means

## 2024-04-01 NOTE — ED ADULT NURSE NOTE - NS ED NURSE DISCH DISPOSITION
Admitted
aerobic capacity/endurance/gait, locomotion, and balance/muscle strength/poor safety awareness

## 2025-06-19 NOTE — ED BEHAVIORAL HEALTH ASSESSMENT NOTE - JUDGMENT (REGARDING EVERYDAY EVENTS)
Patient had scheduled appointment on 7/7/25 and Mildred Carnes CNP will not be in the office. Appointment needs to be re-scheduled.      Spoke with mom and informed her above, mom verbalized understanding and was ok with re-scheduling appointment. Appointment has been re-scheduled to 6/27/25 (Ok per Mildred)  
Poor